# Patient Record
Sex: FEMALE | Race: WHITE | Employment: FULL TIME | URBAN - METROPOLITAN AREA
[De-identification: names, ages, dates, MRNs, and addresses within clinical notes are randomized per-mention and may not be internally consistent; named-entity substitution may affect disease eponyms.]

---

## 2018-03-26 ENCOUNTER — OFFICE VISIT (OUTPATIENT)
Dept: NEUROLOGY | Age: 43
End: 2018-03-26

## 2018-03-26 ENCOUNTER — TELEPHONE (OUTPATIENT)
Dept: NEUROLOGY | Age: 43
End: 2018-03-26

## 2018-03-26 VITALS
DIASTOLIC BLOOD PRESSURE: 62 MMHG | BODY MASS INDEX: 23.78 KG/M2 | WEIGHT: 148 LBS | HEIGHT: 66 IN | SYSTOLIC BLOOD PRESSURE: 118 MMHG | OXYGEN SATURATION: 97 % | HEART RATE: 74 BPM | RESPIRATION RATE: 18 BRPM

## 2018-03-26 DIAGNOSIS — R26.89 BALANCE PROBLEM: ICD-10-CM

## 2018-03-26 DIAGNOSIS — F07.81 POST CONCUSSIVE SYNDROME: Primary | ICD-10-CM

## 2018-03-26 RX ORDER — DULOXETIN HYDROCHLORIDE 30 MG/1
30 CAPSULE, DELAYED RELEASE ORAL DAILY
COMMUNITY
End: 2021-08-04

## 2018-03-26 RX ORDER — LANOLIN ALCOHOL/MO/W.PET/CERES
10 CREAM (GRAM) TOPICAL
COMMUNITY

## 2018-03-26 RX ORDER — SUMATRIPTAN 100 MG/1
100 TABLET, FILM COATED ORAL
Qty: 9 TAB | Refills: 5 | Status: SHIPPED | OUTPATIENT
Start: 2018-03-26 | End: 2021-06-30

## 2018-03-26 RX ORDER — LEVOTHYROXINE SODIUM 75 UG/1
TABLET ORAL
COMMUNITY
End: 2021-02-14

## 2018-03-26 RX ORDER — BISMUTH SUBSALICYLATE 262 MG
1 TABLET,CHEWABLE ORAL DAILY
COMMUNITY
End: 2021-06-30

## 2018-03-26 RX ORDER — NAPROXEN SODIUM 220 MG
220 TABLET ORAL AS NEEDED
COMMUNITY
End: 2021-06-30

## 2018-03-26 NOTE — PROGRESS NOTES
NEUROLOGY NEW PATIENT CONSULTATION    REFERRED BY:  Chely Rodriguez MD    CHIEF COMPLAINT:  Headaches/imbalance    HISTORY OF PRESENT ILLNESS    HISTORY PROVIDED BY:  Patient  Family Member: daughter    Nany Lawrence is a 38 y.o. female who I am asked to see in consultation for headaches and balance issues. She has a history of concussion. She feels like physically she is clumsy. She has a weak ankle on the left. She broke her right big toe in December when a balance beam landed on it. She also has neuropathy of the feet due to prior lumbar disc disease. She has had two surgeries L5-S1. She was pregnant with her 3rd child and bent over and herniated the disc. She then pulled it out again 2 months post partum. She now has neuropathy on the right leg. She had an EMG done a year post op that showed damage from the knee down that is permanent. She had a concussion in Oct 2016. They were at an event when she was crawling out of her [employer] [place] and fell out and hit the back of her head on the arm of a folding chair. She had severe pain where she hit. She had severe headache. She didn't realize she might have had a concussion. Three months later in 2017 they were on a sking trip and had been on a 5 hour car ride. She got out of the car and the door swung back and hit her. She was dizzy, nauseaous, had severe headache. She didn't lose consciousness. She went to the ED and had a CT head. This was negative. She was diagnosed with concussion. She did 2 weeks of balance PT and was out of work. At 2 weeks it started improving. She still had some word finding issues. Overall she felt better. She did have an MRI C spine due to pain and was told she had arthritis. She has hit her head on cabinets several times since her concussion. At the beginning of March she hit her head on the dining room table and developed dizziness and headache. She had symptoms for several days then improved.  Following this she started getting severe headaches. She had left over imitrex that she took x 1. Subsequently she was diagnosed with an ear infection by her PCP on Friday. She is still on doxy for this. She was on augmentin for her dental work already. She does have a dental implant, number 19. She had this placed in October of 2017. This was two weeks prior to the first concussion. She just had to have the screw changed in March. She did have headaches with activity initially when it was placed. Tooth was on the left, headaches were on the right. She does have autoimmune thyroid. She doesn't have history of reactions to metals herself. FH of mom with Lupus, aunt with MS, aunt with RA, brother and cousins with [place]. Headaches improved with the doxycycline and she started with zyrtec at night. She also added flonase. Headache now low grade on right side. The other headaches were more intense and frontal. She does have bruxism. She does see an acupuncturist. She is on [address]. She feels like this has helped her with pain, etc. She will go every 6-8 weeks for an hour. She did have a past MRI brain in 2012 that was normal.     She does have a history of lumbar stenosis with laminectomy in 2011. She does have peripheral neuropathy. She is on cymbalta 30mg daily. She had gained weight on it. This decreasing dose did not have an effect on her headaches. She has gained about 15lbs over the course of the year but she is losing it. 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 She saw Dr. Lawson Tovar when she worked there.       PMH  Past Medical History:   Diagnosis Date    Depression    Hx of laminectomy      Social History     Social History    Marital status:      Spouse name: N/A    Number of children: N/A    Years of education: N/A     Social History Main Topics    Smoking status: None    Smokeless tobacco: None    Alcohol use None    Drug use: None    Sexual activity: Not Asked Other Topics Concern    None     Social History Narrative    None       FH  +FH of autoimmune as detailed in HPI    ALLERGIES  NKDA    CURRENT MEDS  Levothyroxine  Duloxetine  Melatonin  multivitamin    REVIEW OF SYSTEMS:     Y  N       Y  N  Y  N   Y  N  [] [x] AIDS          [x] [] Falls  [] [x] Memory Loss  [] [x]  Shortness of breath  [] [x] Anxiety          [x] [] Fatigue [] [x] Muscle Pain        [] [x]  Skipped beats  [] [x] Chest Pain   [] [x] Frequent HA [] [x] Ms Weakness     [] [x]  Snoring  [] [x] Constipation [] [x]Hearing loss [] [x] Nause/Vomiting  [] [x]  Stomach Pain  [] [x] Cough          [x] []Hepatitis [] [x] Neuropathy         [] [x]  Swallowing difficulty  [] [x] Depression  [] [x]Incontinence [] [x] Poor appetite      [] [x]  Vertigo  [] [x] Diarrhea       [] [x] Joint Pain [] [x] Rash                   [] [x]  Visual disturbances  [] [x] Fainting        [] [x] Leg Swelling [] [x] Ringing ears       [x] []  Weight changes      []Unable to obtain  ROS due to  []mental status change  []sedated   []intubated          PREVIOUS WORKUP  IMAGING: MRI brain in 2011 neg    Reviewed records from [facility] and saw MRI L spine with disc at L5 with herniation on right nerve root and MRI brain that was negative. LABS  No results found for this or any previous visit. PHYSICAL EXAM  Visit Vitals    /62    Pulse 74    Resp 18    Ht 5' 6\" (1.676 m)    Wt 67.1 kg (148 lb)    SpO2 97%    BMI 23.89 kg/m2     General:  Alert, cooperative, no distress. Head:  Normocephalic, without obvious abnormality, atraumatic. Eyes:  Conjunctivae/corneas clear. Pupils equal, round, reactive to light. Extraocular movements intact, VFF, NO papilledema   Lungs:  Heart:   Non labored breathing  Regular rate and rhythm, no carotid bruits   Abdomen:   Soft, non-distended   Extremities: Extremities normal, atraumatic, no cyanosis or edema. Pulses: 2+ and symmetric all extremities.    Skin: Skin color, texture, turgor normal. No rashes or lesions. Neurologic:  Gen: Attention normal             Language: naming, repetition, fluency normal             Memory: intact recent and remote memory  Cranial Nerves:  I: smell Not tested   II: visual fields Full to confrontation   II: pupils Equal, round, reactive to light   II: optic disc No papilledema   III,VII: ptosis none   III,IV,VI: extraocular muscles  Full ROM   V: mastication normal   V: facial light touch sensation  normal   VII: facial muscle function   symmetric   VIII: hearing symmetric   IX: soft palate elevation  normal   XI: trapezius strength  5/5   XI: sternocleidomastoid strength 5/5   XI: neck flexion strength  5/5   XII: tongue  midline     Motor: normal bulk and tone, no tremor              Strength: 5/5 all four extremities  Sensory: dec PP bilateral feet, R >L  Coordination: FTN intact, Rhomberg negative  Gait: normal gait including tandem   Reflexes: 3+ throughout, toes mute       IMPRESSION  Braeden Gallo is a 38 y.o. female who presents for evaluation of headaches. Patient suffered several concussions and I think this is contributing to her symptoms. She has typical postconcussive syndrome including dizziness, headaches, and balance issues. She also has some occasional word finding difficulties. She has had multiple concussions which could be contributing to her prolonged difficulties. At this point I do not think she needs a daily preventative. I would like to get updated imaging with an MRI of the brain to evaluate for any structural etiology of her repeated headaches and balance issues. Also, given her history of lumbar stenosis with a large disc at L5 that has not been removed, and her continued balance issues, I think reimaging of this would also be beneficial.  I suspect patient's neuropathy secondary to her lumbar stenosis could be contributing to her balance issues. RECOMMENDATIONS  1. MRI of the brain with and without contrast  2. MRI of the lumbar spine  3. Reviewed records from [facility].  Will hold off on any preventative medications for headache at this time. Discussed amitriptyline could be an option for postconcussive syndrome, but patient would like to give it time  5. May need to repeat balance physical therapy, but patient does have exercises she can do at home, so we will pursue this initially  6. Discussed postconcussive syndrome and education provided to patient. 7.  Imitrex for abortive. New prescription written today. FU 3 months    Alexandra Goltz, MD    CC: Moy Germain MD  Fax: [phone]    This note was created using voice recognition software. Despite editing, there may be syntax errors. This note will not be viewable in [address]

## 2018-03-26 NOTE — LETTER
Dear Giovanni Milian MD, Thank you for allowing me to see your patient, Tahir Mace for a neurological consultation. Please see my impression and recommendations as outlined in my note. Sincerely, Blas Crawford MD 
[facility] at [address] [place] [address] BY: 
Giovanni Milian MD 
 
CHIEF COMPLAINT: 
Headaches/imbalance HISTORY OF PRESENT ILLNESS HISTORY PROVIDED BY: 
Patient Family Member: daughter Tahir Mace is a 38 y.o. female who I am asked to see in consultation for headaches and balance issues. She has a history of concussion. She feels like physically she is clumsy. She has a weak ankle on the left. She broke her right big toe in December when a balance beam landed on it. She also has neuropathy of the feet due to prior lumbar disc disease. She has had two surgeries L5-S1. She was pregnant with her 3rd child and bent over and herniated the disc. She then pulled it out again 2 months post partum. She now has neuropathy on the right leg. She had an EMG done a year post op that showed damage from the knee down that is permanent. She had a concussion in Oct 2016. They were at an event when she was crawling out of her [employer] and fell out and hit the back of her head on the arm of a folding chair. She had severe pain where she hit. She had severe headache. She didn't realize she might have had a concussion. Three months later in 2017 they were on a sking trip and had been on a 5 hour car ride. She got out of the car and the door swung back and hit her. She was dizzy, nauseaous, had severe headache. She didn't lose consciousness. She went to the ED and had a CT head. This was negative. She was diagnosed with concussion. She did 2 weeks of balance PT and was out of work. At 2 weeks it started improving. She still had some word finding issues. Overall she felt better. She did have an MRI C spine due to pain and was told she had arthritis. She has hit her head on cabinets several times since her concussion. At the beginning of March she hit her head on the dining room table and developed dizziness and headache. She had symptoms for several days then improved. Following this she started getting severe headaches. She had left over imitrex that she took x 1. Subsequently she was diagnosed with an ear infection by her PCP on Friday. She is still on doxy for this. She was on augmentin for her dental work already. She does have a dental implant, number 19. She had this placed in October of 2017. This was two weeks prior to the first concussion. She just had to have the screw changed in March. She did have headaches with activity initially when it was placed. Tooth was on the left, headaches were on the right. She does have autoimmune thyroid. She doesn't have history of reactions to metals herself. FH of mom with Lupus, aunt with MS, aunt with RA, brother and cousins with [place]. Headaches improved with the doxycycline and she started with zyrtec at night. She also added flonase. Headache now low grade on right side. The other headaches were more intense and frontal. She does have bruxism. She does see an acupuncturist. She is on [address]. She feels like this has helped her with pain, etc. She will go every 6-8 weeks for an hour. She did have a past MRI brain in 2012 that was normal.  
 
She does have a history of lumbar stenosis with laminectomy in 2011. She does have peripheral neuropathy. She is on cymbalta 30mg daily. She had gained weight on it. This decreasing dose did not have an effect on her headaches. She has gained about 15lbs over the course of the year but she is losing it. 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 She saw Dr. Migdalia Winston when she worked there. [facility] Past Medical History:  
Diagnosis Date  Depression Hx of laminectomy [address] Social History Social History  Marital status:  Spouse name: N/A  
 Number of children: N/A  
 Years of education: N/A Social History Main Topics  Smoking status: None  Smokeless tobacco: None  Alcohol use None  Drug use: None  Sexual activity: Not Asked Other Topics Concern  None Social History Narrative  None FH 
+FH of autoimmune as detailed in HPI ALLERGIES 
NKDA CURRENT MEDS Levothyroxine Duloxetine Melatonin 
multivitamin REVIEW OF SYSTEMS:  
 
Y  N       Y  N  Y  N   Y  N 
[] [x] AIDS          [x] [] Falls  [] [x] Memory Loss  [] [x]  Shortness of breath 
[] [x] Anxiety          [x] [] Fatigue [] [x] Muscle Pain        [] [x]  Skipped beats 
[] [x] Chest Pain   [] [x] Frequent HA [] [x] Ms Weakness     [] [x]  Snoring 
[] [x] Constipation [] [x]Hearing loss [] [x] Nause/Vomiting  [] [x]  Stomach Pain 
[] [x] Cough          [x] []Hepatitis [] [x] Neuropathy         [] [x]  Swallowing difficulty 
[] [x] Depression  [] [x]Incontinence [] [x] Poor appetite      [] [x]  Vertigo 
[] [x] Diarrhea       [] [x] Joint Pain [] [x] Rash                   [] [x]  Visual disturbances 
[] [x] Fainting        [] [x] Leg Swelling [] [x] Ringing ears       [x] []  Weight changes []Unable to obtain  ROS due to  []mental status change  []sedated   []intubated PREVIOUS WORKUP IMAGING: MRI brain in 2011 neg Reviewed records from [address] [place] [address] and saw MRI L spine with disc at L5 with herniation on right nerve root and MRI brain that was negative. LABS No results found for this or any previous visit. PHYSICAL EXAM 
Visit Vitals  /62  Pulse 74  Resp 18  Ht 5' 6\" (1.676 m)  Wt 67.1 kg (148 lb)  SpO2 97%  BMI 23.89 kg/m2 General:  Alert, cooperative, no distress. Head:  Normocephalic, without obvious abnormality, atraumatic. Eyes:  Conjunctivae/corneas clear. Pupils equal, round, reactive to light. Extraocular movements intact, VFF, NO papilledema Lungs: 
Heart:   Non labored breathing Regular rate and rhythm, no carotid bruits Abdomen:   Soft, non-distended Extremities: Extremities normal, atraumatic, no cyanosis or edema. Pulses: 2+ and symmetric all extremities. Skin: Skin color, texture, turgor normal. No rashes or lesions. Neurologic:  Gen: Attention normal 
           Language: naming, repetition, fluency normal 
           Memory: intact recent and remote memory Cranial Nerves: 
I: smell Not tested II: visual fields Full to confrontation II: pupils Equal, round, reactive to light II: optic disc No papilledema III,VII: ptosis none III,IV,VI: extraocular muscles  Full ROM V: mastication normal  
V: facial light touch sensation  normal  
VII: facial muscle function   symmetric VIII: hearing symmetric IX: soft palate elevation  normal  
XI: trapezius strength  5/5 XI: sternocleidomastoid strength 5/5 XI: neck flexion strength  5/5 XII: tongue  midline Motor: normal bulk and tone, no tremor Strength: 5/5 all four extremities Sensory: dec PP bilateral feet, R >L Coordination: FTN intact, Rhomberg negative Gait: normal gait including tandem Reflexes: 3+ throughout, toes mute IMPRESSION Ria Downs is a 38 y.o. female who presents for evaluation of headaches. Patient suffered several concussions and I think this is contributing to her symptoms. She has typical postconcussive syndrome including dizziness, headaches, and balance issues. She also has some occasional word finding difficulties. She has had multiple concussions which could be contributing to her prolonged difficulties. At this point I do not think she needs a daily preventative.   I would like to get updated imaging with an MRI of the brain to evaluate for any structural etiology of her repeated headaches and balance issues. Also, given her history of lumbar stenosis with a large disc at L5 that has not been removed, and her continued balance issues, I think reimaging of this would also be beneficial.  I suspect patient's neuropathy secondary to her lumbar stenosis could be contributing to her balance issues. RECOMMENDATIONS 1. MRI of the brain with and without contrast 
2. MRI of the lumbar spine 3. Reviewed records from [facility] 4.  Will hold off on any preventative medications for headache at this time. Discussed amitriptyline could be an option for postconcussive syndrome, but patient would like to give it time 5. May need to repeat balance physical therapy, but patient does have exercises she can do at home, so we will pursue this initially 6. Discussed postconcussive syndrome and education provided to patient. 7.  Imitrex for abortive. New prescription written today. FU 3 months Mahogany Lin MD 
 
CC: Miles Chapman MD 
Fax: [phone] This note was created using voice recognition software. Despite editing, there may be syntax errors. This note will not be viewable in [address] Reviewed record in preparation for visit and have necessary documentation Pt did not bring medication to office visit for review Medication list reviewed and reconciled with patient Information was given to pt on Advanced Directives, Living Will 
opportunity was given for questions

## 2018-03-26 NOTE — PATIENT INSTRUCTIONS
[facility]   Statement to Patients  April 1, 2014      In an effort to ensure the large volume of patient prescription refills is processed in the most efficient and expeditious manner, we are asking our patients to assist us by calling your Pharmacy for all prescription refills, this will include also your  Mail Order Pharmacy. The pharmacy will contact our office electronically to continue the refill process. Please do not wait until the last minute to call your pharmacy. We need at least 48 hours (2days) to fill prescriptions. We also encourage you to call your pharmacy before going to  your prescription to make sure it is ready. With regard to controlled substance prescription refill requests (narcotic refills) that need to be picked up at our office, we ask your cooperation by providing us with at least 72 hours (3days) notice that you will need a refill. We will not refill narcotic prescription refill requests after 4:00pm on any weekday, Monday through Thursday, or after 2:00pm on Fridays, or on the weekends. We encourage everyone to explore another way of getting your prescription refill request processed using "[employer] our patient web portal through our electronic medical record system. "[employer] is an efficient and effective way to communicate your medication request directly to the office and  downloadable as an rony on your smart phone . "[employer] also features a review functionality that allows you to view your medication list as well as leave messages for your physician. Are you ready to get connected? If so please review the attatched instructions or speak to any of our staff to get you set up right away! Thank you so much for your cooperation. Should you have any questions please contact our Practice Administrator.     The Physicians and Staff,  [facility] [id number] [address]  What is a living will?    A living will is a legal form you use to write down the kind of care you want at the end of your life. It is used by the health professionals who will treat you if you aren't able to decide for yourself. If you put your wishes in writing, your loved ones and others will know what kind of care you want. They won't need to guess. This can ease your mind and be helpful to others. A living will is not the same as an estate or property will. An estate will explains what you want to happen with your money and property after you die. Is a living will a legal document? A living will is a legal document. Each state has its own laws about living vernon. If you move to another state, make sure that your living will is legal in the state where you now live. Or you might use a universal form that has been approved by many states. This kind of form can sometimes be completed and stored online. Your electronic copy will then be available wherever you have a connection to the Internet. In most cases, doctors will respect your wishes even if you have a form from a different state. · You don't need an  to complete a living will. But legal advice can be helpful if your state's laws are unclear, your health history is complicated, or your family can't agree on what should be in your living will. · You can change your living will at any time. Some people find that their wishes about end-of-life care change as their health changes. · In addition to making a living will, think about completing a medical power of  form. This form lets you name the person you want to make end-of-life treatment decisions for you (your \"health care agent\") if you're not able to. Many hospitals and nursing homes will give you the forms you need to complete a living will and a medical power of . · Your living will is used only if you can't make or communicate decisions for yourself anymore.  If you become able to make decisions again, you can accept or refuse any treatment, no matter what you wrote in your living will. · Your state may offer an online registry. This is a place where you can store your living will online so the doctors and nurses who need to treat you can find it right away. What should you think about when creating a living will? Talk about your end-of-life wishes with your family members and your doctor. Let them know what you want. That way the people making decisions for you won't be surprised by your choices. Think about these questions as you make your living will:  · Do you know enough about life support methods that might be used? If not, talk to your doctor so you know what might be done if you can't breathe on your own, your heart stops, or you're unable to swallow. · What things would you still want to be able to do after you receive life-support methods? Would you want to be able to walk? To speak? To eat on your own? To live without the help of machines? · If you have a choice, where do you want to be cared for? In your home? At a hospital or nursing home? · Do you want certain Baptism practices performed if you become very ill? · If you have a choice at the end of your life, where would you prefer to die? At home? In a hospital or nursing home? Somewhere else? · Would you prefer to be buried or cremated? · Do you want your organs to be donated after you die? What should you do with your living will? · Make sure that your family members and your health care agent have copies of your living will. · Give your doctor a copy of your living will to keep in your medical record. If you have more than one doctor, make sure that each one has a copy. · You may want to put a copy of your living will where it can be easily found. Where can you learn more? Go to http://iraida-chu.info/. Enter R297 in the search box to learn more about \"Learning About Living Vadim. \"  Current as of: September 24, 2016  Content Version: 11.4  © 4305-1118 [employer]. Care instructions adapted under license by [employer] (which disclaims liability or warranty for this information). If you have questions about a medical condition or this instruction, always ask your healthcare professional. [address] any warranty or liability for your use of this information. Advance Directives: Care Instructions  Your Care Instructions  An advance directive is a legal way to state your wishes at the end of your life. It tells your family and your doctor what to do if you can no longer say what you want. There are two main types of advance directives. You can change them any time that your wishes change. · A living will tells your family and your doctor your wishes about life support and other treatment. · A durable power of  for health care lets you name a person to make treatment decisions for you when you can't speak for yourself. This person is called a health care agent. If you do not have an advance directive, decisions about your medical care may be made by a doctor or a  who doesn't know you. It may help to think of an advance directive as a gift to the people who care for you. If you have one, they won't have to make tough decisions by themselves. Follow-up care is a key part of your treatment and safety. Be sure to make and go to all appointments, and call your doctor if you are having problems. It's also a good idea to know your test results and keep a list of the medicines you take. How can you care for yourself at home? · Discuss your wishes with your loved ones and your doctor. This way, there are no surprises. · Many states have a unique form. Or you might use a universal form that has been approved by many states. This kind of form can sometimes be completed and stored online.  Your electronic copy will then be available wherever you have a connection to the Internet. In most cases, doctors will respect your wishes even if you have a form from a different state. · You don't need a  to do an advance directive. But you may want to get legal advice. · Think about these questions when you prepare an advance directive:  ¨ Who do you want to make decisions about your medical care if you are not able to? Many people choose a family member or close friend. ¨ Do you know enough about life support methods that might be used? If not, talk to your doctor so you understand. ¨ What are you most afraid of that might happen? You might be afraid of having pain, losing your independence, or being kept alive by machines. ¨ Where would you prefer to die? Choices include your home, a hospital, or a nursing home. ¨ Would you like to have information about hospice care to support you and your family? ¨ Do you want to donate organs when you die? ¨ Do you want certain Anglican practices performed before you die? If so, put your wishes in the advance directive. · Read your advance directive every year, and make changes as needed. When should you call for help? Be sure to contact your doctor if you have any questions. Where can you learn more? Go to http://iraida-chu.info/. Enter R264 in the search box to learn more about \"Advance Directives: Care Instructions. \"  Current as of: September 24, 2016  Content Version: 11.4  © 3087-0908 [employer]. Care instructions adapted under license by [employer] (which disclaims liability or warranty for this information). If you have questions about a medical condition or this instruction, always ask your healthcare professional. [address] any warranty or liability for your use of this information. Patient Instructions/Plans:  Postconcussion syndrome (The Basics)    What is postconcussion syndrome?   Postconcussion syndrome is a condition that happens after a mild brain injury. A concussion is another word for a mild brain injury. Common causes of mild brain injuries include:   Car accidents   Falling down and other accidents that can happen from daily activities   Injuries from playing sports such as football, ice hockey, soccer, and boxing   Injuries that can happen to soldiers during combat. These include injuries from blasts and bullet wounds. What are the symptoms of postconcussion syndrome?  The symptoms include:  Headache   Dizziness   Feeling very tired   Feeling irritable or anxious   Memory problems or problems paying attention   Problems with sleep  Being easily bothered by noise     Will I need tests?  Maybe. Your doctor or nurse should be able to tell if you have postconcussion syndrome by learning about your symptoms and doing an exam.   But depending on your symptoms, you might have tests to make sure you do not have a different problem. For example, some people have an imaging test called an MRI that creates pictures of the brain. How is postconcussion syndrome treated?  The treatments are different depending on which symptoms a person has. There are medicines that can help with headaches, dizziness, and other problems. If you have postconcussion syndrome, your symptoms will probably start to go away after about a week. Most people start to feel better in a week or 2 and are back to normal in 3 months. But a few people have symptoms that last longer.

## 2018-03-26 NOTE — MR AVS SNAPSHOT
[address] [address] [facility] [address] 
[phone] Patient: Aura Louis MRN: [id number] Visit Information Date & Time Provider Department Dept. Phone Encounter #  
 3/26/2018  9:00 AM Huma Saunders MD [address] [facility] [phone] [id number] Your Appointments 5/14/2018 11:20 AM  
Follow Up with Huma Saunders MD  
[address] [place] [address] Neurology Clinic [facility] Appt Note: headache  
 [address] [address] [address] [address]  
[facility] [address] [facility] [address] Upcoming Health Maintenance Date Due DTaP/Tdap/Td series (1 - Tdap) 7/19/1998 PAP AKA CERVICAL CYTOLOGY 7/19/1998 Influenza Age 5 to Adult 8/1/2017 Allergies as of 3/26/2018  Review Complete On: 3/26/2018 By: Huma Saunders MD  
 Not on File Current Immunizations  Never Reviewed No immunizations on file. Not reviewed this visit You Were Diagnosed With   
  
 Codes Comments Post concussive syndrome    -  Primary ICD-10-CM: F07.81 ICD-9-CM: 310.2 Balance problem     ICD-10-CM: R26.89 
ICD-9-CM: 781.99 Vitals BP Pulse Resp Height(growth percentile) Weight(growth percentile) SpO2  
 118/62 74 18 5' 6\" (1.676 m) 148 lb (67.1 kg) 97% BMI Smoking Status 23.89 kg/m2 Never Smoker BMI and BSA Data Body Mass Index Body Surface Area  
 23.89 kg/m 2 1.77 m 2 Preferred Pharmacy Pharmacy Name Phone [facility], [address] [phone] Your Updated Medication List  
  
   
This list is accurate as of 3/26/18 10:08 AM.  Always use your most recent med list.  
  
  
  
  
 CYMBALTA 30 mg capsule Generic drug:  DULoxetine Take 30 mg by mouth daily. levothyroxine 75 mcg tablet Commonly known as:  SYNTHROID  
 Take  by mouth Daily (before breakfast). melatonin 3 mg tablet Take  by mouth.  
  
 multivitamin tablet Commonly known as:  ONE A DAY Take 1 Tab by mouth daily. PROBIOTIC 4X 10-15 mg Tbec Generic drug:  B.infantis-B.ani-B.long-B.bifi Take  by mouth. SUMAtriptan 100 mg tablet Commonly known as:  IMITREX Take 1 Tab by mouth once as needed for Migraine for up to 1 dose. Prescriptions Sent to Pharmacy Refills SUMAtriptan (IMITREX) 100 mg tablet 5 Sig: Take 1 Tab by mouth once as needed for Migraine for up to 1 dose. Class: Normal  
 Pharm[facility]: [facility], [address] [address] Ph #: [phone] Route: Oral  
  
To-Do List   
 03/27/2018 Imaging:  MRI BRAIN W WO CONT   
  
 03/27/2018 Imaging:  MRI LUMB SPINE W WO CONT Patient Instructions PRESCRIPTION REFILL POLICY [facility] Statement to Patients April 1, 2014 In an effort to ensure the large volume of patient prescription refills is processed in the most efficient and expeditious manner, we are asking our patients to assist us by calling your Pharmacy for all prescription refills, this will include also your  Mail Order Pharmacy. The pharmacy will contact our office electronically to continue the refill process. Please do not wait until the last minute to call your pharmacy. We need at least 48 hours (2days) to fill prescriptions. We also encourage you to call your pharmacy before going to  your prescription to make sure it is ready. With regard to controlled substance prescription refill requests (narcotic refills) that need to be picked up at our office, we ask your cooperation by providing us with at least 72 hours (3days) notice that you will need a refill.  
 
We will not refill narcotic prescription refill requests after 4:00pm on any weekday, Monday through Thursday, or after 2:00pm on Fridays, or on the weekends. We encourage everyone to explore another way of getting your prescription refill request processed using [employer], our patient web portal through our electronic medical record system. [employer] is an efficient and effective way to communicate your medication request directly to the office and  downloadable as an rony on your smart phone . [employer] also features a review functionality that allows you to view your medication list as well as leave messages for your physician. Are you ready to get connected? If so please review the attatched instructions or speak to any of our staff to get you set up right away! Thank you so much for your cooperation. Should you have any questions please contact our Practice Administrator. The Physicians and Staff,  [facility] Keyon Srinivasan [address] What is a living will? A living will is a legal form you use to write down the kind of care you want at the end of your life. It is used by the health professionals who will treat you if you aren't able to decide for yourself. If you put your wishes in writing, your loved ones and others will know what kind of care you want. They won't need to guess. This can ease your mind and be helpful to others. A living will is not the same as an estate or property will. An estate will explains what you want to happen with your money and property after you die. Is a living will a legal document? A living will is a legal document. Each state has its own laws about living vernon. If you move to another state, make sure that your living will is legal in the state where you now live. Or you might use a universal form that has been approved by many states. This kind of form can sometimes be completed and stored online. Your electronic copy will then be available wherever you have a connection to the Internet.  In most cases, doctors will respect your wishes even if you have a form from a different state. · You don't need an  to complete a living will. But legal advice can be helpful if your state's laws are unclear, your health history is complicated, or your family can't agree on what should be in your living will. · You can change your living will at any time. Some people find that their wishes about end-of-life care change as their health changes. · In addition to making a living will, think about completing a medical power of  form. This form lets you name the person you want to make end-of-life treatment decisions for you (your \"health care agent\") if you're not able to. Many hospitals and nursing homes will give you the forms you need to complete a living will and a medical power of . · Your living will is used only if you can't make or communicate decisions for yourself anymore. If you become able to make decisions again, you can accept or refuse any treatment, no matter what you wrote in your living will. · Your state may offer an online registry. This is a place where you can store your living will online so the doctors and nurses who need to treat you can find it right away. What should you think about when creating a living will? Talk about your end-of-life wishes with your family members and your doctor. Let them know what you want. That way the people making decisions for you won't be surprised by your choices. Think about these questions as you make your living will: · Do you know enough about life support methods that might be used? If not, talk to your doctor so you know what might be done if you can't breathe on your own, your heart stops, or you're unable to swallow. · What things would you still want to be able to do after you receive life-support methods? Would you want to be able to walk? To speak? To eat on your own? To live without the help of machines? · If you have a choice, where do you want to be cared for? In your home? At a hospital or nursing home? · Do you want certain Hindu practices performed if you become very ill? · If you have a choice at the end of your life, where would you prefer to die? At home? In a hospital or nursing home? Somewhere else? · Would you prefer to be buried or cremated? · Do you want your organs to be donated after you die? What should you do with your living will? · Make sure that your family members and your health care agent have copies of your living will. · Give your doctor a copy of your living will to keep in your medical record. If you have more than one doctor, make sure that each one has a copy. · You may want to put a copy of your living will where it can be easily found. Where can you learn more? Go to http://iraida-chu.info/. Enter H270 in the search box to learn more about \"Learning About Living Perroyamilet. \" Current as of: September 24, 2016 Content Version: 11.4 © 4454-5985 [employer]. Care instructions adapted under license by ihush.com (which disclaims liability or warranty for this information). If you have questions about a medical condition or this instruction, always ask your healthcare professional. [place] [address] any warranty or liability for your use of this information. Advance Directives: Care Instructions Your Care Instructions An advance directive is a legal way to state your wishes at the end of your life. It tells your family and your doctor what to do if you can no longer say what you want. There are two main types of advance directives. You can change them any time that your wishes change. · A living will tells your family and your doctor your wishes about life support and other treatment. · A durable power of  for health care lets you name a person to make treatment decisions for you when you can't speak for yourself. This person is called a health care agent. If you do not have an advance directive, decisions about your medical care may be made by a doctor or a  who doesn't know you. It may help to think of an advance directive as a gift to the people who care for you. If you have one, they won't have to make tough decisions by themselves. Follow-up care is a key part of your treatment and safety. Be sure to make and go to all appointments, and call your doctor if you are having problems. It's also a good idea to know your test results and keep a list of the medicines you take. How can you care for yourself at home? · Discuss your wishes with your loved ones and your doctor. This way, there are no surprises. · Many states have a unique form. Or you might use a universal form that has been approved by many states. This kind of form can sometimes be completed and stored online. Your electronic copy will then be available wherever you have a connection to the Internet. In most cases, doctors will respect your wishes even if you have a form from a different state. · You don't need a  to do an advance directive. But you may want to get legal advice. · Think about these questions when you prepare an advance directive: ¨ Who do you want to make decisions about your medical care if you are not able to? Many people choose a family member or close friend. ¨ Do you know enough about life support methods that might be used? If not, talk to your doctor so you understand. ¨ What are you most afraid of that might happen? You might be afraid of having pain, losing your independence, or being kept alive by machines. ¨ Where would you prefer to die? Choices include your home, a hospital, or a nursing home. ¨ Would you like to have information about hospice care to support you and your family? ¨ Do you want to donate organs when you die? ¨ Do you want certain Hinduism practices performed before you die? If so, put your wishes in the advance directive. · Read your advance directive every year, and make changes as needed. When should you call for help? Be sure to contact your doctor if you have any questions. Where can you learn more? Go to http://iraida-chu.info/. Enter R264 in the search box to learn more about \"Advance Directives: Care Instructions. \" Current as of: September 24, 2016 Content Version: 11.4 © 4000-2502 [employer]. Care instructions adapted under license by [employer] (which disclaims liability or warranty for this information). If you have questions about a medical condition or this instruction, always ask your healthcare professional. [place] [address] any warranty or liability for your use of this information. Patient Instructions/Plans: 
Postconcussion syndrome (The Basics) What is postconcussion syndrome?  Postconcussion syndrome is a condition that happens after a mild brain injury. A concussion is another word for a mild brain injury. Common causes of mild brain injuries include:  
Car accidents Falling down and other accidents that can happen from daily activities Injuries from playing sports such as football, ice hockey, soccer, and boxing Injuries that can happen to soldiers during combat. These include injuries from blasts and bullet wounds. What are the symptoms of postconcussion syndrome?  The symptoms include: 
Headache Dizziness Feeling very tired Feeling irritable or anxious Memory problems or problems paying attention Problems with sleep Being easily bothered by noise Will I need tests?  Maybe. Your doctor or nurse should be able to tell if you have postconcussion syndrome by learning about your symptoms and doing an exam.  
But depending on your symptoms, you might have tests to make sure you do not have a different problem. For example, some people have an imaging test called an MRI that creates pictures of the brain. How is postconcussion syndrome treated?  The treatments are different depending on which symptoms a person has. There are medicines that can help with headaches, dizziness, and other problems. If you have postconcussion syndrome, your symptoms will probably start to go away after about a week. Most people start to feel better in a week or 2 and are back to normal in 3 months. But a few people have symptoms that last longer. Introducing [facility]! Omar Vega introduces [employer] patient portal. Now you can access parts of your medical record, email your doctor's office, and request medication refills online. 1. In your internet browser, go to [email or web address] 2. Click on the First Time User? Click Here link in the Sign In box. You will see the New Member Sign Up page. 3. Enter your [employer] Access Code exactly as it appears below. You will not need to use this code after youve completed the sign-up process. If you do not sign up before the expiration date, you must request a new code. · [employer] Access Code: [email or web address] Expires: 6/24/2018  8:58 AM 
 
4. Enter the last four digits of your Social Security Number (xxxx) and Date of Birth (mm/dd/yyyy) as indicated and click Submit. You will be taken to the next sign-up page. 5. Create a [employer] ID. This will be your [employer] login ID and cannot be changed, so think of one that is secure and easy to remember. 6. Create a [employer] password. You can change your password at any time. 7. Enter your Password Reset Question and Answer. This can be used at a later time if you forget your password. 8. Enter your e-mail address. You will receive e-mail notification when new information is available in [address] 9. Click Sign Up. You can now view and download portions of your medical record. 10. Click the Download Summary menu link to download a portable copy of your medical information. If you have questions, please visit the Frequently Asked Questions section of the nextsocialt website. Remember, [employer] is NOT to be used for urgent needs. For medical emergencies, dial 911. Now available from your iPhone and Android! Please provide this summary of care documentation to your next provider. Your primary care clinician is listed as Bernice Ceja. If you have any questions after today's visit, please call [phone].

## 2018-04-09 ENCOUNTER — TELEPHONE (OUTPATIENT)
Dept: NEUROLOGY | Age: 43
End: 2018-04-09

## 2018-04-09 NOTE — TELEPHONE ENCOUNTER
Lm on vm letting know will send order to [facility] and they will call for appt  Return this call if needed

## 2018-04-09 NOTE — TELEPHONE ENCOUNTER
----- Message from Margarita Clark sent at 4/9/2018  8:15 AM EDT -----  Regarding: /telephone  Pt is requesting a call back in regards to having her MRI done at the [facility]. Best contact [phone].     [facility] [phone]

## 2018-04-13 ENCOUNTER — TELEPHONE (OUTPATIENT)
Dept: NEUROLOGY | Age: 43
End: 2018-04-13

## 2018-04-13 NOTE — TELEPHONE ENCOUNTER
Wilmar Flores from Outpatient Scheduling called and needs notes for the pt to be scheduled for [facility]. Their fax number is [phone].

## 2018-04-26 ENCOUNTER — TELEPHONE (OUTPATIENT)
Dept: NEUROLOGY | Age: 43
End: 2018-04-26

## 2018-04-26 ENCOUNTER — DOCUMENTATION ONLY (OUTPATIENT)
Dept: NEUROLOGY | Age: 43
End: 2018-04-26

## 2018-04-26 NOTE — PROGRESS NOTES
MRI brain w/wo approved #[id number]    MRI L spine denied unless patient does 6 weeks of PT first and has re evaluation    Ave Falcon MD  4/26/2018  8:21 AM

## 2018-04-26 NOTE — TELEPHONE ENCOUNTER
LM on VM for patient to call office. This is concerning the need for PT to be done for 6wks so that we can possibly get the authorization for the MRI of the L-spine.

## 2018-04-27 NOTE — TELEPHONE ENCOUNTER
----- Message from [email or web address] sent at 4/27/2018  9:30 AM EDT -----  Regarding: Dr Ascencio/Telephone  Pt was returning a phone call.       Contact [phone]

## 2018-05-03 NOTE — TELEPHONE ENCOUNTER
Message left again for patient re her insurance requiring PT prior to authorizing MRI. Call office with decision.

## 2018-05-23 LAB — PAP SMEAR, EXTERNAL: NORMAL

## 2018-05-24 LAB — LDL-C, EXTERNAL: 73

## 2018-05-29 ENCOUNTER — TELEPHONE (OUTPATIENT)
Dept: NEUROLOGY | Age: 43
End: 2018-05-29

## 2018-05-29 NOTE — TELEPHONE ENCOUNTER
----- Message from Donald Garay sent at 5/29/2018 10:54 AM EDT -----  Regarding: Dr. Carmencita Gtz   Pt called to get a the results for her MRI and would like a call back. Pt best contact number ([phone].

## 2018-05-30 ENCOUNTER — TELEPHONE (OUTPATIENT)
Dept: NEUROLOGY | Age: 43
End: 2018-05-30

## 2018-05-30 NOTE — TELEPHONE ENCOUNTER
----- Message from Yovani Roe sent at 5/30/2018  3:21 PM EDT -----  Regarding: Dr. Magi Key  Pt returning a miss call from the nurse.  Best contact number [phone]

## 2018-05-30 NOTE — TELEPHONE ENCOUNTER
----- Message from Angel Sharpe sent at 5/30/2018 10:01 AM EDT -----  Regarding: Dr. Catherine Strauss   Pt would like to get her MRI results that was done [facility]. Best contact number is [phone].

## 2018-05-31 NOTE — TELEPHONE ENCOUNTER
Discussed MRI results that were negative per Dr. Evelio Smith. Patient does not want to do PT in order to get lumber MRI at this time.

## 2018-06-06 ENCOUNTER — DOCUMENTATION ONLY (OUTPATIENT)
Dept: NEUROLOGY | Age: 43
End: 2018-06-06

## 2018-06-07 NOTE — PROGRESS NOTES
MRI of the brain exam date 5/23/18.   Result: Normal MRI of the brain with and without contrast.  See media tab for full report    Ave Falcon MD  6/6/2018

## 2018-07-30 ENCOUNTER — OFFICE VISIT (OUTPATIENT)
Dept: NEUROLOGY | Age: 43
End: 2018-07-30

## 2018-07-30 VITALS
DIASTOLIC BLOOD PRESSURE: 68 MMHG | RESPIRATION RATE: 16 BRPM | WEIGHT: 146 LBS | BODY MASS INDEX: 23.46 KG/M2 | HEART RATE: 64 BPM | HEIGHT: 66 IN | SYSTOLIC BLOOD PRESSURE: 108 MMHG | OXYGEN SATURATION: 98 %

## 2018-07-30 DIAGNOSIS — F07.81 POST CONCUSSIVE SYNDROME: ICD-10-CM

## 2018-07-30 DIAGNOSIS — G43.009 MIGRAINE WITHOUT AURA AND WITHOUT STATUS MIGRAINOSUS, NOT INTRACTABLE: ICD-10-CM

## 2018-07-30 DIAGNOSIS — R42 VERTIGO: Primary | ICD-10-CM

## 2018-07-30 NOTE — PROGRESS NOTES
Neurology Progress Note Patient ID: Venancio Rodriguez [id number] 
70 y.o. 
1975 HISTORY PROVIDED BY: 
Patient Chief Complaint: Dizziness/headache/balance issues Subjective:  
 Ms. Micaela Torres is here for follow up today of headaches and balance issues. Her headaches were going well up until a week ago. She was on vacation and hit her head on a TV on the wall. She got dizziness and nausea immediately. She had to go right to bed. She slept all day. She took ibuprofen whenever she woke up. By the following morning they drove to the beach and she felt sick with nausea and headache but was fine while resting on the beach. She now has a headache on the right side. Her balance is off. She just got back from a vacation today. She took a red eye last night. Recap: 
Venancio Rodriguez is a 38 y.o. female who I am asked to see in consultation for headaches and balance issues. She has a history of concussion. She feels like physically she is clumsy. She has a weak ankle on the left. She broke her right big toe in December when a balance beam landed on it. She also has neuropathy of the feet due to prior lumbar disc disease. She has had two surgeries L5-S1. She was pregnant with her 3rd child and bent over and herniated the disc. She then pulled it out again 2 months post partum. She now has neuropathy on the right leg. She had an EMG done a year post op that showed damage from the knee down that is permanent. She had a concussion in Oct 2016. They were at an event when she was crawling out of her [employer] [place] and fell out and hit the back of her head on the arm of a folding chair. She had severe pain where she hit. She had severe headache. She didn't realize she might have had a concussion. Three months later in 2017 they were on a sking trip and had been on a 5 hour car ride. She got out of the car and the door swung back and hit her. She was dizzy, nauseaous, had severe headache.  She didn't lose consciousness. She went to the ED and had a CT head. This was negative. She was diagnosed with concussion. She did 2 weeks of balance PT and was out of work. At 2 weeks it started improving. She still had some word finding issues. Overall she felt better. She did have an MRI C spine due to pain and was told she had arthritis. She has hit her head on cabinets several times since her concussion. At the beginning of March she hit her head on the dining room table and developed dizziness and headache. She had symptoms for several days then improved. Following this she started getting severe headaches. She had left over imitrex that she took x 1. Subsequently she was diagnosed with an ear infection by her PCP on Friday. She is still on doxy for this. She was on augmentin for her dental work already. She does have a dental implant, number 19. She had this placed in October of 2017. This was two weeks prior to the first concussion. She just had to have the screw changed in March. She did have headaches with activity initially when it was placed. Tooth was on the left, headaches were on the right. She does have autoimmune thyroid. She doesn't have history of reactions to metals herself. FH of mom with Lupus, aunt with MS, aunt with RA, brother and cousins with Brien Garcia. Headaches improved with the doxycycline and she started with zyrtec at night. She also added flonase. Headache now low grade on right side. The other headaches were more intense and frontal. She does have bruxism. She does see an acupuncturist. She is on [address]. She feels like this has helped her with pain, etc. She will go every 6-8 weeks for an hour. She did have a past MRI brain in 2012 that was normal.  
 
She does have a history of lumbar stenosis with laminectomy in 2011. She does have peripheral neuropathy. She is on cymbalta 30mg daily. She had gained weight on it.  This decreasing dose did not have an effect on her headaches. She has gained about 15lbs over the course of the year but she is losing it. 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 She used to work at [employer]. She saw Dr. Samira Nogueira when she worked there. Objective:  
ROS: 
Per HPI- 
Otherwise 12 point ROS was negative Meds: 
Current Outpatient Prescriptions on File Prior to Visit Medication Sig Dispense Refill  levothyroxine (SYNTHROID) 75 mcg tablet Take  by mouth Daily (before breakfast).  multivitamin (ONE A DAY) tablet Take 1 Tab by mouth daily.  melatonin 3 mg tablet Take 1 mg by mouth nightly.  DULoxetine (CYMBALTA) 30 mg capsule Take 30 mg by mouth daily.  B.infantis-B.ani-B.long-B.bifi (PROBIOTIC 4X) 10-15 mg TbEC Take  by mouth.  SUMAtriptan (IMITREX) 100 mg tablet Take 1 Tab by mouth once as needed for Migraine for up to 1 dose. 9 Tab 5  
 naproxen sodium (ALEVE) 220 mg tablet Take 220 mg by mouth as needed. No current facility-administered medications on file prior to visit. Imaging: MRI brain: Normal 
 
Reviewed records in [employer] and [facility] tab today Lab Review No results found for this or any previous visit. Exam: 
Visit Vitals  /68  Pulse 64  Resp 16  
 Ht 5' 6\" (1.676 m)  Wt 66.2 kg (146 lb)  SpO2 98%  BMI 23.57 kg/m2 Gen: Well developed CV: RRR Lungs: non labored breathing Abd: non distending Neuro: A&O x 3, no dysarthria or aphasia CN II-XII: PERRL, EOMI, face symmetric, tongue/palate midline Motor: strength 5/5 all four ext Sensory: intact to LT Gait: Slightly wide-based Assessment:  
Serjio Nash is a 41 y.o. female who presents for follow up of headaches and dizziness. Patient suffered another concussion since I saw her last.  Subsequently she has a headache today. She would like to consider vestibular rehab at this point to hopefully help her avoid future imbalance/head trauma. She is Imitrex to take as needed for her migraines.   She also Zofran to take as needed. She does have a history of a disc at L5 so we will need to continue to monitor for this as well. Her insurance refused an MRI of the lumbar spine until she does physical therapy. Plan:  
 
  
Visit Diagnoses Vertigo    -  Primary Relevant Medications · Diclofenac Potassium (CAMBIA) 50 mg pwpk Other Relevant Orders · REFERRAL TO PHYSICAL THERAPY for vestibular rehab Migraine without aura and without status migrainosus, not intractable Relevant Medications · Diclofenac Potassium (CAMBIA) 50 mg pwpk · Imitrex to take as needed · Zofran to take as needed Other Relevant Orders · Encouraged migraine diary Lumbar stenosis · MRI lumbar spine denied by insurance · We will continue to monitor Post concussive syndrome Relevant Medications · Diclofenac Potassium (CAMBIA) 50 mg pwpk Other Relevant Orders · REFERRAL TO PHYSICAL THERAPY for vestibular rehab/postconcussive rehab Follow-up in 3 months Signed: 
Gladys Moon MD 
7/30/2018 Medications and side effects discussed with patient in detail. With any new medications prescribed, patient was given instructions on administration and side effects. Written medication information was provided to the patient as well. This note was created using voice recognition software. Despite editing, there may be syntax errors. This note will not be viewable in [address]

## 2018-07-30 NOTE — LETTER
Neurology Progress Note Patient ID: Bernice Lam [id number] 
76 y.o. 
1975 HISTORY PROVIDED BY: 
Patient Chief Complaint: Dizziness/headache/balance issues Subjective:  
 Ms. Lovena Halsted is here for follow up today of headaches and balance issues. Her headaches were going well up until a week ago. She was on vacation and hit her head on a TV on the wall. She got dizziness and nausea immediately. She had to go right to bed. She slept all day. She took ibuprofen whenever she woke up. By the following morning they drove to the beach and she felt sick with nausea and headache but was fine while resting on the beach. She now has a headache on the right side. Her balance is off. She just got back from a vacation today. She took a red eye last night. Recap: 
Bernice Lam is a 38 y.o. female who I am asked to see in consultation for headaches and balance issues. She has a history of concussion. She feels like physically she is clumsy. She has a weak ankle on the left. She broke her right big toe in December when a balance beam landed on it. She also has neuropathy of the feet due to prior lumbar disc disease. She has had two surgeries L5-S1. She was pregnant with her 3rd child and bent over and herniated the disc. She then pulled it out again 2 months post partum. She now has neuropathy on the right leg. She had an EMG done a year post op that showed damage from the knee down that is permanent. She had a concussion in Oct 2016. They were at an event when she was crawling out of her [employer] [place] and fell out and hit the back of her head on the arm of a folding chair. She had severe pain where she hit. She had severe headache. She didn't realize she might have had a concussion. Three months later in 2017 they were on a sking trip and had been on a 5 hour car ride. She got out of the car and the door swung back and hit her. She was dizzy, nauseaous, had severe headache.  She didn't lose consciousness. She went to the ED and had a CT head. This was negative. She was diagnosed with concussion. She did 2 weeks of balance PT and was out of work. At 2 weeks it started improving. She still had some word finding issues. Overall she felt better. She did have an MRI C spine due to pain and was told she had arthritis. She has hit her head on cabinets several times since her concussion. At the beginning of March she hit her head on the dining room table and developed dizziness and headache. She had symptoms for several days then improved. Following this she started getting severe headaches. She had left over imitrex that she took x 1. Subsequently she was diagnosed with an ear infection by her PCP on Friday. She is still on doxy for this. She was on augmentin for her dental work already. She does have a dental implant, number 19. She had this placed in October of 2017. This was two weeks prior to the first concussion. She just had to have the screw changed in March. She did have headaches with activity initially when it was placed. Tooth was on the left, headaches were on the right. She does have autoimmune thyroid. She doesn't have history of reactions to metals herself. FH of mom with Lupus, aunt with MS, aunt with RA, brother and cousins with Jyotsna Stanford. Headaches improved with the doxycycline and she started with zyrtec at night. She also added flonase. Headache now low grade on right side. The other headaches were more intense and frontal. She does have bruxism. She does see an acupuncturist. She is on [address]. She feels like this has helped her with pain, etc. She will go every 6-8 weeks for an hour. She did have a past MRI brain in 2012 that was normal.  
 
She does have a history of lumbar stenosis with laminectomy in 2011. She does have peripheral neuropathy. She is on cymbalta 30mg daily.  She had gained weight on it. This decreasing dose did not have an effect on her headaches. She has gained about 15lbs over the course of the year but she is losing it. 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 She saw Dr. Manuel Hanson when she worked there. Objective:  
ROS: 
Per HPI- 
Otherwise 12 point ROS was negative Meds: 
Current Outpatient Prescriptions on File Prior to Visit Medication Sig Dispense Refill  levothyroxine (SYNTHROID) 75 mcg tablet Take  by mouth Daily (before breakfast).  multivitamin (ONE A DAY) tablet Take 1 Tab by mouth daily.  melatonin 3 mg tablet Take 1 mg by mouth nightly.  DULoxetine (CYMBALTA) 30 mg capsule Take 30 mg by mouth daily.  B.infantis-B.ani-B.long-B.bifi (PROBIOTIC 4X) 10-15 mg TbEC Take  by mouth.  SUMAtriptan (IMITREX) 100 mg tablet Take 1 Tab by mouth once as needed for Migraine for up to 1 dose. 9 Tab 5  
 naproxen sodium (ALEVE) 220 mg tablet Take 220 mg by mouth as needed. No current facility-administered medications on file prior to visit. Imaging: MRI brain: Normal 
 
Reviewed records in [employer] and [employer] tab today Lab Review No results found for this or any previous visit. Exam: 
Visit Vitals  /68  Pulse 64  Resp 16  
 Ht 5' 6\" (1.676 m)  Wt 66.2 kg (146 lb)  SpO2 98%  BMI 23.57 kg/m2 Gen: Well developed CV: RRR Lungs: non labored breathing Abd: non distending Neuro: A&O x 3, no dysarthria or aphasia CN II-XII: PERRL, EOMI, face symmetric, tongue/palate midline Motor: strength 5/5 all four ext Sensory: intact to LT Gait: Slightly wide-based Assessment:  
Colby Guerrier is a 41 y.o. female who presents for follow up of headaches and dizziness. Patient suffered another concussion since I saw her last.  Subsequently she has a headache today.   She would like to consider vestibular rehab at this point to hopefully help her avoid future imbalance/head trauma. She is Imitrex to take as needed for her migraines. She also Zofran to take as needed. She does have a history of a disc at L5 so we will need to continue to monitor for this as well. Her insurance refused an MRI of the lumbar spine until she does physical therapy. Plan:  
 
  
Visit Diagnoses Vertigo    -  Primary Relevant Medications · Diclofenac Potassium (CAMBIA) 50 mg pwpk Other Relevant Orders · REFERRAL TO PHYSICAL THERAPY for vestibular rehab Migraine without aura and without status migrainosus, not intractable Relevant Medications · Diclofenac Potassium (CAMBIA) 50 mg pwpk · Imitrex to take as needed · Zofran to take as needed Other Relevant Orders · Encouraged migraine diary Lumbar stenosis · MRI lumbar spine denied by insurance · We will continue to monitor Post concussive syndrome Relevant Medications · Diclofenac Potassium (CAMBIA) 50 mg pwpk Other Relevant Orders · REFERRAL TO PHYSICAL THERAPY for vestibular rehab/postconcussive rehab Follow-up in 3 months Signed: 
Rosalia Ayon MD 
7/30/2018 Medications and side effects discussed with patient in detail. With any new medications prescribed, patient was given instructions on administration and side effects. Written medication information was provided to the patient as well. This note was created using voice recognition software. Despite editing, there may be syntax errors. This note will not be viewable in [address] Chief Complaint Patient presents with  Migraine 1. Have you been to the ER, urgent care clinic since your last visit? Hospitalized since your last visit? No 
 
2. Have you seen or consulted any other health care providers outside of the [address] [place] [address] since your last visit? Include any pap smears or colon screening. No  
 
Visit Vitals  /68  Pulse 64  Resp 16  
 Ht 5' 6\" (1.676 m)  Wt 66.2 kg (146 lb)  SpO2 98%  BMI 23.57 kg/m2

## 2018-07-30 NOTE — PATIENT INSTRUCTIONS
PRESCRIPTION REFILL POLICY [facility] Statement to Patients April 1, 2014 In an effort to ensure the large volume of patient prescription refills is processed in the most efficient and expeditious manner, we are asking our patients to assist us by calling your Pharmacy for all prescription refills, this will include also your  Mail Order Pharmacy. The pharmacy will contact our office electronically to continue the refill process. Please do not wait until the last minute to call your pharmacy. We need at least 48 hours (2days) to fill prescriptions. We also encourage you to call your pharmacy before going to  your prescription to make sure it is ready. With regard to controlled substance prescription refill requests (narcotic refills) that need to be picked up at our office, we ask your cooperation by providing us with at least 72 hours (3days) notice that you will need a refill. We will not refill narcotic prescription refill requests after 4:00pm on any weekday, Monday through Thursday, or after 2:00pm on Fridays, or on the weekends. We encourage everyone to explore another way of getting your prescription refill request processed using [employer], our patient web portal through our electronic medical record system. [employer] is an efficient and effective way to communicate your medication request directly to the office and  downloadable as an rony on your smart phone . [employer] also features a review functionality that allows you to view your medication list as well as leave messages for your physician. Are you ready to get connected? If so please review the attatched instructions or speak to any of our staff to get you set up right away! Thank you so much for your cooperation. Should you have any questions please contact our Practice Administrator. The Physicians and Staff,  [facility] Patient Instruction Plan/ Result Policy If we have ordered testing for you, know that; \"NO NEWS IS GOOD NEWS! \" It is our policy that we know longer call patients with results, nor do we  give test results over the phone. We schedule follow up appointments so that your results can be discussed in person. This allows you to address any questions you have regarding the results. If something of concern is revealed on your test, we will contact you to discuss the matter and if needed schedule a sooner follow up appointment. Additionally, results may be found by using the My Chart feature and one of our patient service representatives at the  can give you instructions on how to access this feature to utilize our electronic medical record system. Thank you for your understanding. Patient Instructions/Plans: 
Diclofenac (By mouth) Diclofenac (dye-KLOE-fen-ak) Treats pain. Also treats migraines. This medicine is an NSAID. Brand Name(s): Cambia, Cataflam, DermaSilkRx Anodynexa Geovanni, DermaSilkRx [employer], DermacinRx Jorge, Cumeira, NuDiclo TabPAK, PrevidolRx Analgesic Geovanni, PrevidolRx Plus Analgesic Geovanni, Voltaren, Zipsor, [employer] There may be other brand names for this medicine. When This Medicine Should Not Be Used: This medicine is not right for everyone. Do not use it if you had an allergic reaction to diclofenac, aspirin, or another NSAID. How to Use This Medicine:  
Capsule, Liquid, Tablet, Coated Tablet, Long Acting Tablet · Your doctor will tell you how much medicine to use. Do not use more than directed. · This medicine should come with a Medication Guide. Ask your pharmacist for a copy if you do not have one. · Oral solution: Mix the packet contents with 1 to 2 ounces (30 to 60 mL) of water. Do not use any liquid other than water for mixing the medicine. Mix well and drink it immediately on an empty stomach. · Missed dose: Take a dose as soon as you remember.  If it is almost time for your next dose, wait until then and take a regular dose. Do not take extra medicine to make up for a missed dose. · Store the medicine in a closed container at room temperature, away from heat, moisture, and direct light. . 
Drugs and Foods to Avoid: Ask your doctor or pharmacist before using any other medicine, including over-the-counter medicines, vitamins, and herbal products. · Do not use any other NSAID unless your doctor says it is okay. Some other NSAIDs are aspirin, diflunisal, ibuprofen, naproxen, or salsalate. · Some medicines and foods can affect how diclofenac works. Tell your doctor if you are also using any of the following: ¨ Cyclosporine, digoxin, lithium, methotrexate, pemetrexed ¨ Blood pressure medicine ¨ Blood thinner (such as warfarin) ¨ Diuretic (water pill) ¨ Medicine to treat depression Shamir.Lax Steroid medicine Warnings While Using This Medicine: · Tell your doctor if you are pregnant or breastfeeding. Do not use this medicine during the later part of a pregnancy, unless your doctor tells you to. · Tell your doctor if you have kidney disease, liver disease, asthma, heart failure, high blood pressure, or heart or blood vessel problems, or a history of stomach ulcers or bleeding problems. Tell your doctor if you have phenylketonuria (PKU). Also tell your doctor if you drink alcohol. · This medicine may cause the following problems: 
¨ Increased risk of heart attack, heart failure, or stroke ¨ Bleeding in your stomach or intestines ¨ Liver problems ¨ Serious skin reactions · Your headaches may become worse if you use a headache medicine for 10 or more days per month. Write down how often your headaches occur and how often you use this medicine. · Your doctor will do lab tests at regular visits to check on the effects of this medicine. Keep all appointments. · Keep all medicine out of the reach of children. Never share your medicine with anyone.  
Possible Side Effects While Using This Medicine:  
Call your doctor right away if you notice any of these side effects: · Allergic reaction: Itching or hives, swelling in your face or hands, swelling or tingling in your mouth or throat, chest tightness, trouble breathing · Blistering, peeling, or red skin rash · Bloody or black, tarry stools, severe stomach pain, vomiting blood or material that looks like coffee grounds · Change in how much or how often you urinate · Chest pain that may spread to your arms, jaw, back, or neck, trouble breathing, unusual sweating, faintness · Dark urine or pale stools, nausea, vomiting, loss of appetite, stomach pain, yellow skin or eyes · Numbness or weakness in your arm or leg, or on one side of your body, pain in your lower leg, sudden or severe headache, or problems with vision, speech, or walking · Rapid weight gain, swelling in your hands, ankles, or feet If you notice these less serious side effects, talk with your doctor: · Constipation or diarrhea · Mild headache If you notice other side effects that you think are caused by this medicine, tell your doctor. Call your doctor for medical advice about side effects. You may report side effects to FDA at 6-443-FDA-6552 © 2017 [address] Information is for End User's use only and may not be sold, redistributed or otherwise used for commercial purposes. The above information is an  only. It is not intended as medical advice for individual conditions or treatments. Talk to your doctor, nurse or pharmacist before following any medical regimen to see if it is safe and effective for you.

## 2018-07-30 NOTE — MR AVS SNAPSHOT
[address] [address] [id number] [place] [address] 
[phone] Patient: Cristobal Patel MRN: [id number] TET:3/95/6887 Visit Information Date & Time Provider Department Dept. Phone Encounter #  
 7/30/2018  3:20 PM Pj Ramires MD [facility] [phone] [id number] Follow-up Instructions Return in about 3 months (around 10/30/2018). Upcoming Health Maintenance Date Due DTaP/Tdap/Td series (1 - Tdap) 7/19/1998 PAP AKA CERVICAL CYTOLOGY 7/19/1998 Influenza Age 5 to Adult 8/1/2018 Allergies as of 7/30/2018  Review Complete On: 7/30/2018 By: Pj Ramires MD  
 No Known Allergies Current Immunizations  Never Reviewed No immunizations on file. Not reviewed this visit You Were Diagnosed With   
  
 Codes Comments Vertigo    -  Primary ICD-10-CM: W06 ICD-9-CM: 780.4 Migraine without aura and without status migrainosus, not intractable     ICD-10-CM: Z70.849 ICD-9-CM: 346.10 Post concussive syndrome     ICD-10-CM: F07.81 ICD-9-CM: 310.2 Vitals BP Pulse Resp Height(growth percentile) Weight(growth percentile) SpO2  
 108/68 64 16 5' 6\" (1.676 m) 146 lb (66.2 kg) 98% BMI Smoking Status 23.57 kg/m2 Never Smoker BMI and BSA Data Body Mass Index Body Surface Area  
 23.57 kg/m 2 1.76 m 2 Preferred Pharmacy Pharmacy Name Phone Simeon Mcclain [address] [facility], [address] [phone] Your Updated Medication List  
  
   
This list is accurate as of 7/30/18  4:02 PM.  Always use your most recent med list.  
  
  
  
  
 ALEVE 220 mg tablet Generic drug:  naproxen sodium Take 220 mg by mouth as needed. CYMBALTA 30 mg capsule Generic drug:  DULoxetine Take 30 mg by mouth daily. Diclofenac Potassium 50 mg Pwpk Commonly known as:  CAMBIA Take 1 Package by mouth daily as needed. levothyroxine 75 mcg tablet Commonly known as:  SYNTHROID Take  by mouth Daily (before breakfast). melatonin 3 mg tablet Take 1 mg by mouth nightly. multivitamin tablet Commonly known as:  ONE A DAY Take 1 Tab by mouth daily. PROBIOTIC 4X 10-15 mg Tbec Generic drug:  B.infantis-B.ani-B.long-B.bifi Take  by mouth. SUMAtriptan 100 mg tablet Commonly known as:  IMITREX Take 1 Tab by mouth once as needed for Migraine for up to 1 dose. We Performed the Following REFERRAL TO PHYSICAL THERAPY [BHF47 Custom] Comments:  
 [facility] for vestibular rehab. Follow-up Instructions Return in about 3 months (around 10/30/2018). Referral Information Referral ID Referred By Referred To  
  
 [id number] Maximiliano Deras Not Available Visits Status Start Date End Date 1 New Request 7/30/18 7/30/19 If your referral has a status of pending review or denied, additional information will be sent to support the outcome of this decision. Patient Instructions PRESCRIPTION REFILL POLICY [facility] Statement to Patients April 1, 2014 In an effort to ensure the large volume of patient prescription refills is processed in the most efficient and expeditious manner, we are asking our patients to assist us by calling your Pharmacy for all prescription refills, this will include also your  Mail Order Pharmacy. The pharmacy will contact our office electronically to continue the refill process. Please do not wait until the last minute to call your pharmacy. We need at least 48 hours (2days) to fill prescriptions. We also encourage you to call your pharmacy before going to  your prescription to make sure it is ready.   
 
With regard to controlled substance prescription refill requests (narcotic refills) that need to be picked up at our office, we ask your cooperation by providing us with at least 72 hours (3days) notice that you will need a refill. We will not refill narcotic prescription refill requests after 4:00pm on any weekday, Monday through Thursday, or after 2:00pm on Fridays, or on the weekends. We encourage everyone to explore another way of getting your prescription refill request processed using [facility], our patient web portal through our electronic medical record system. [facility] is an efficient and effective way to communicate your medication request directly to the office and  downloadable as an rony on your smart phone . [facility] also features a review functionality that allows you to view your medication list as well as leave messages for your physician. Are you ready to get connected? If so please review the attatched instructions or speak to any of our staff to get you set up right away! Thank you so much for your cooperation. Should you have any questions please contact our Practice Administrator. The Physicians and Staff,  [facility] Neurology [facility] Patient Instruction Plan/ Result Policy If we have ordered testing for you, know that; \"NO NEWS IS GOOD NEWS! \" It is our policy that we know longer call patients with results, nor do we  give test results over the phone. We schedule follow up appointments so that your results can be discussed in person. This allows you to address any questions you have regarding the results. If something of concern is revealed on your test, we will contact you to discuss the matter and if needed schedule a sooner follow up appointment. Additionally, results may be found by using the My Chart feature and one of our patient service representatives at the  can give you instructions on how to access this feature to utilize our electronic medical record system. Thank you for your understanding. Patient Instructions/Plans: 
Diclofenac (By mouth) Diclofenac (dye-KLOE-fen-ak) Treats pain. Also treats migraines. This medicine is an NSAID. Brand Name(s): Cambia, Cataflam, DermaSilkRx Anodynexa Geovanni, DermaSilkRx [employer], DermacinRx Jorge, Cumeira, NuDiclo TabPAK, PrevidolRx Analgesic Geovanni, PrevidolRx Plus Analgesic Geovanni, Voltaren, Zipsor, [employer] There may be other brand names for this medicine. When This Medicine Should Not Be Used: This medicine is not right for everyone. Do not use it if you had an allergic reaction to diclofenac, aspirin, or another NSAID. How to Use This Medicine:  
Capsule, Liquid, Tablet, Coated Tablet, Long Acting Tablet · Your doctor will tell you how much medicine to use. Do not use more than directed. · This medicine should come with a Medication Guide. Ask your pharmacist for a copy if you do not have one. · Oral solution: Mix the packet contents with 1 to 2 ounces (30 to 60 mL) of water. Do not use any liquid other than water for mixing the medicine. Mix well and drink it immediately on an empty stomach. · Missed dose: Take a dose as soon as you remember. If it is almost time for your next dose, wait until then and take a regular dose. Do not take extra medicine to make up for a missed dose. · Store the medicine in a closed container at room temperature, away from heat, moisture, and direct light. . 
Drugs and Foods to Avoid: Ask your doctor or pharmacist before using any other medicine, including over-the-counter medicines, vitamins, and herbal products. · Do not use any other NSAID unless your doctor says it is okay. Some other NSAIDs are aspirin, diflunisal, ibuprofen, naproxen, or salsalate. · Some medicines and foods can affect how diclofenac works. Tell your doctor if you are also using any of the following: ¨ Cyclosporine, digoxin, lithium, methotrexate, pemetrexed ¨ Blood pressure medicine ¨ Blood thinner (such as warfarin) ¨ Diuretic (water pill) ¨ Medicine to treat depression Fern.Abraham Steroid medicine Warnings While Using This Medicine: · Tell your doctor if you are pregnant or breastfeeding. Do not use this medicine during the later part of a pregnancy, unless your doctor tells you to. · Tell your doctor if you have kidney disease, liver disease, asthma, heart failure, high blood pressure, or heart or blood vessel problems, or a history of stomach ulcers or bleeding problems. Tell your doctor if you have phenylketonuria (PKU). Also tell your doctor if you drink alcohol. · This medicine may cause the following problems: 
¨ Increased risk of heart attack, heart failure, or stroke ¨ Bleeding in your stomach or intestines ¨ Liver problems ¨ Serious skin reactions · Your headaches may become worse if you use a headache medicine for 10 or more days per month. Write down how often your headaches occur and how often you use this medicine. · Your doctor will do lab tests at regular visits to check on the effects of this medicine. Keep all appointments. · Keep all medicine out of the reach of children. Never share your medicine with anyone. Possible Side Effects While Using This Medicine:  
Call your doctor right away if you notice any of these side effects: · Allergic reaction: Itching or hives, swelling in your face or hands, swelling or tingling in your mouth or throat, chest tightness, trouble breathing · Blistering, peeling, or red skin rash · Bloody or black, tarry stools, severe stomach pain, vomiting blood or material that looks like coffee grounds · Change in how much or how often you urinate · Chest pain that may spread to your arms, jaw, back, or neck, trouble breathing, unusual sweating, faintness · Dark urine or pale stools, nausea, vomiting, loss of appetite, stomach pain, yellow skin or eyes · Numbness or weakness in your arm or leg, or on one side of your body, pain in your lower leg, sudden or severe headache, or problems with vision, speech, or walking · Rapid weight gain, swelling in your hands, ankles, or feet If you notice these less serious side effects, talk with your doctor: · Constipation or diarrhea · Mild headache If you notice other side effects that you think are caused by this medicine, tell your doctor. Call your doctor for medical advice about side effects. You may report side effects to FDA at 8-023-JER-7942 © 2017 [address] Information is for End User's use only and may not be sold, redistributed or otherwise used for commercial purposes. The above information is an  only. It is not intended as medical advice for individual conditions or treatments. Talk to your doctor, nurse or pharmacist before following any medical regimen to see if it is safe and effective for you. Introducing \[facility] [place] [facility]\"" & HEALTH SERVICES! Dear Pattie Mendes: Thank you for requesting a [employer] account. Our records indicate that you already have an active [email or web address] account. You can access your account anytime at [email or web address] Did you know that you can access your hospital and ER discharge instructions at any time in [email or web address]? You can also review all of your test results from your hospital stay or ER visit. Additional Information If you have questions, please visit the Frequently Asked Questions section of the [email or web address] website at [email or web address] Remember, [email or web address] is NOT to be used for urgent needs. For medical emergencies, dial 911. Now available from your iPhone and Android! Please provide this summary of care documentation to your next provider. Your primary care clinician is listed as Abad Horan. If you have any questions after today's visit, please call [phone].

## 2018-07-30 NOTE — PROGRESS NOTES
Chief Complaint Patient presents with  Migraine 1. Have you been to the ER, urgent care clinic since your last visit? Hospitalized since your last visit? No 
 
2. Have you seen or consulted any other health care providers outside of the [address] [place] [address] since your last visit? Include any pap smears or colon screening. No  
 
Visit Vitals  /68  Pulse 64  Resp 16  
 Ht 5' 6\" (1.676 m)  Wt 66.2 kg (146 lb)  SpO2 98%  BMI 23.57 kg/m2

## 2018-08-20 LAB — MAMMOGRAPHY, EXTERNAL: NORMAL

## 2019-03-20 ENCOUNTER — TELEPHONE (OUTPATIENT)
Dept: NEUROLOGY | Age: 44
End: 2019-03-20

## 2019-03-20 NOTE — TELEPHONE ENCOUNTER
----- Message from Yuliet Dubon sent at 3/20/2019  8:44 AM EDT -----  Regarding: Dr. Claudia Aceves  Pt is requesting a call back from Dr. Stanley Valverde in reference to recommendations for neurologist in the practice & medication \"Zofran\". Pt is requesting for medication \"zofran\" to be sent to [address] [place] [address] [phone]. Best contact [phone].

## 2019-03-21 RX ORDER — ONDANSETRON 4 MG/1
4 TABLET, FILM COATED ORAL
Qty: 30 TAB | Refills: 2 | Status: SHIPPED | OUTPATIENT
Start: 2019-03-21

## 2019-03-21 NOTE — TELEPHONE ENCOUNTER
Order placed for Zofran 4 mg , PO, per Verbal Order from Dr. Stanley Valverde on 3/21/2019 due to nausea.

## 2019-03-21 NOTE — TELEPHONE ENCOUNTER
Its fine to refill the Zofran assuming she makes a follow-up appointment with 1 of the other providers.

## 2019-03-21 NOTE — TELEPHONE ENCOUNTER
Lm on VM that patient medication was sent into the pharmacy, however she needs an appt. For additional refills and she will have to make that with a different provider as Dr. Kiran last day is tomorrow.

## 2019-09-21 LAB — CREATININE, EXTERNAL: 0.74

## 2020-07-29 VITALS — HEIGHT: 66 IN | BODY MASS INDEX: 27.16 KG/M2 | WEIGHT: 169 LBS

## 2020-07-29 PROBLEM — E03.9 HYPOTHYROIDISM: Status: ACTIVE | Noted: 2020-07-29

## 2020-07-29 PROBLEM — M79.2 PERIPHERAL NEUROPATHIC PAIN: Status: ACTIVE | Noted: 2020-07-29

## 2020-07-29 PROBLEM — R60.9 EDEMA: Status: ACTIVE | Noted: 2020-07-29

## 2020-07-29 PROBLEM — G43.909 MIGRAINE: Status: ACTIVE | Noted: 2020-07-29

## 2020-07-29 PROBLEM — E04.1 THYROID NODULE: Status: ACTIVE | Noted: 2020-07-29

## 2020-07-29 PROBLEM — E55.9 VITAMIN D DEFICIENCY: Status: ACTIVE | Noted: 2020-07-29

## 2020-07-29 RX ORDER — PREDNISONE 20 MG/1
TABLET ORAL
COMMUNITY
End: 2021-06-30

## 2020-07-29 RX ORDER — IBUPROFEN 800 MG/1
TABLET ORAL
COMMUNITY
End: 2021-06-30 | Stop reason: ALTCHOICE

## 2020-07-29 RX ORDER — ACETAMINOPHEN 500 MG
TABLET ORAL 2 TIMES DAILY
COMMUNITY

## 2020-07-29 RX ORDER — PROPRANOLOL HYDROCHLORIDE 10 MG/1
TABLET ORAL 3 TIMES DAILY
COMMUNITY
End: 2021-08-04

## 2020-07-29 RX ORDER — ALBUTEROL SULFATE 90 UG/1
AEROSOL, METERED RESPIRATORY (INHALATION)
COMMUNITY

## 2020-07-29 RX ORDER — ESCITALOPRAM OXALATE 20 MG/1
20 TABLET ORAL DAILY
COMMUNITY

## 2020-07-29 RX ORDER — CETIRIZINE HCL 10 MG
TABLET ORAL
COMMUNITY
End: 2021-06-30 | Stop reason: ALTCHOICE

## 2020-07-29 RX ORDER — BECLOMETHASONE DIPROPIONATE HFA 80 UG/1
1 AEROSOL, METERED RESPIRATORY (INHALATION)
COMMUNITY
End: 2021-06-30 | Stop reason: ALTCHOICE

## 2021-06-30 ENCOUNTER — OFFICE VISIT (OUTPATIENT)
Dept: NEUROLOGY | Age: 46
End: 2021-06-30
Payer: MEDICARE

## 2021-06-30 VITALS
HEIGHT: 66 IN | HEART RATE: 63 BPM | BODY MASS INDEX: 26.36 KG/M2 | WEIGHT: 164 LBS | SYSTOLIC BLOOD PRESSURE: 134 MMHG | DIASTOLIC BLOOD PRESSURE: 82 MMHG | OXYGEN SATURATION: 99 % | RESPIRATION RATE: 14 BRPM

## 2021-06-30 DIAGNOSIS — R47.89 ALTERATION IN SPEECH: ICD-10-CM

## 2021-06-30 DIAGNOSIS — R13.10 DYSPHAGIA, UNSPECIFIED TYPE: ICD-10-CM

## 2021-06-30 DIAGNOSIS — F41.9 ANXIETY: ICD-10-CM

## 2021-06-30 DIAGNOSIS — R20.0 RIGHT SIDED NUMBNESS: Primary | ICD-10-CM

## 2021-06-30 DIAGNOSIS — R41.89 COGNITIVE IMPAIRMENT: ICD-10-CM

## 2021-06-30 DIAGNOSIS — F07.81 POST CONCUSSIVE SYNDROME: ICD-10-CM

## 2021-06-30 PROCEDURE — 99214 OFFICE O/P EST MOD 30 MIN: CPT | Performed by: NURSE PRACTITIONER

## 2021-06-30 RX ORDER — RIMEGEPANT SULFATE 75 MG/75MG
75 TABLET, ORALLY DISINTEGRATING ORAL
COMMUNITY

## 2021-06-30 RX ORDER — BUSPIRONE HYDROCHLORIDE 10 MG/1
10 TABLET ORAL 3 TIMES DAILY
Qty: 90 TABLET | Refills: 1 | Status: SHIPPED | OUTPATIENT
Start: 2021-06-30

## 2021-06-30 NOTE — PROGRESS NOTES
Chief Complaint   Patient presents with    Numbness      right sided numbness and trouble swallowing ( feels right side of throat is asleep)     Other     nerve zings mostly on right side      Visit Vitals  /82 (BP 1 Location: Left arm, BP Patient Position: Sitting)   Pulse 63   Resp 14   Ht 5' 6\" (1.676 m)   Wt 74.4 kg (164 lb)   SpO2 99%   BMI 26.47 kg/m²

## 2021-06-30 NOTE — PROGRESS NOTES
[address] [facility],5[address]  [address]   [place] [address] [facility] [address] [place] [address] [address]   [phone] Office   [phone] Fax           Date:  21     Name:  Tammie Romero  :  1975  MRN:  [id number]     PCP:  Fredi Banda MD    Chief Complaint   Patient presents with    Numbness      right sided numbness and trouble swallowing ( feels right side of throat is asleep)     Other     nerve zings mostly on right side        HISTORY OF PRESENT ILLNESS: Ms. Carolina Salguero presents today for further evaluation of right sided numbness, zinging sensations mostly right sided, and trouble swallowing. She was seen about three years ago by Dr. Cong Mariee who was treating her for headaches and post concussive syndrome. Currently, she is being treated by PMR at [address] [place] [address]. She returns today with one week history of right sided heaviness. She indicates that this started off with a zinging sensation in the right posterior leg. The next thing she noticed was numbness in the right hand. She demonstrates that she has numbness on the entire right side, like someone primo a line down the center of her body. Numbness was severe enough that she bit the right side of her tongue and did not notice this until she tasted the blood in her mouth. She reports some difficulty swallowing due to the right side of her throat feeling asleep. She has noticed a slight lisp when talking. She is also noticing nerve zinging that is mostly on the right but can occur anywhere on her body. Cognitively, she does not feel as sharp and is easily distracted. She does acknowledge anxiety and admits that the week prior to these issues she had a particularly stressful week. Concussion history:   2019: sitting in a folding lawn chair with her dog's leash wrapped around her waist while with friends and family at the fire pit in her yard when the dog decided to run off after something. Dog pulled her down and she hit the left side of her head. Later that evening, she tripped over a stick one of her children was using to poke the fire while chasing the youngest in play. Lorena Debar and hit the other side of her head. Went to bed that night and woke with headache. Was not getting better so went to the ER three weeks later with severe headache, nausea. July, 2018: She was on vacation and hit her head on a TV on the wall. She got dizziness and nausea immediately. She had to go right to bed. She slept all day. She took ibuprofen whenever she woke up. January, 2017: She was on a sking trip and had been on a 5 hour car ride. She got out of the car and the door swung back and hit her. She was dizzy, nauseaous, had severe headache. She didn't lose consciousness. She went to the ED and had a CT head. This was negative. She was diagnosed with concussion. October, 2016: They were at an event when she was crawling out of her [employer] and fell out and hit the back of her head on the arm of a folding chair. She had severe pain where she hit. She had severe headache. She didn't realize she might have had a concussion. MRI of the brain exam date 5/23/18. Result: Normal MRI of the brain with and without contrast. (see media for full report)    Labs 06/25/2021: LEIGHA, Lyme, Babesia Microti AB panel, A. phagocytophilum PCR, B12, Ferritin, PTH Intact, Mg, Sed rate, TSH, free T4, CMP, and CBC: all WNL (see media for full report)      FH of mom with Lupus, aunt with MS, aunt with RA, brother and cousins with Dieter Ashraf. Current Outpatient Medications   Medication Sig    rimegepant (Nurtec ODT) 75 mg disintegrating tablet Take 75 mg by mouth once as needed for Migraine.  levothyroxine (SYNTHROID) 75 mcg tablet TAKE ONE TABLET BY MOUTH EVERY MORNING ON AN EMPTY STOMACH    albuterol (PROVENTIL HFA, VENTOLIN HFA, PROAIR HFA) 90 mcg/actuation inhaler Take  by inhalation.     escitalopram oxalate (LEXAPRO) 20 mg tablet Take 20 mg by mouth daily.  MAGNESIUM PO Take  by mouth.  propranoloL (INDERAL) 10 mg tablet Take  by mouth three (3) times daily.  acetaminophen (TYLENOL PO) Take  by mouth.  cholecalciferol (VITAMIN D3) (2,000 UNITS /50 MCG) cap capsule Take  by mouth two (2) times a day.  ondansetron hcl (ZOFRAN) 4 mg tablet Take 1 Tab by mouth every eight (8) hours as needed for Nausea.  melatonin 3 mg tablet Take 1 mg by mouth nightly.  DULoxetine (CYMBALTA) 30 mg capsule Take 30 mg by mouth daily.  B.infantis-B.ani-B.long-B.bifi (PROBIOTIC 4X) 10-15 mg TbEC Take  by mouth. No current facility-administered medications for this visit.      Allergies   Allergen Reactions    Grass Pollen Unknown (comments)     Past Medical History:   Diagnosis Date    Anxiety     Concussion     -    Depression     Thyroid disease      Past Surgical History:   Procedure Laterality Date    HX ORTHOPAEDIC      laminectomy x2 [id number]    HX OTHER SURGICAL  2017    dental implant #19    OH REPAIR OF LAMINECTOMY DEFECT, ADD-ON      x2      Social History     Socioeconomic History    Marital status:      Spouse name: Not on file    Number of children: Not on file    Years of education: Not on file    Highest education level: Not on file   Occupational History    Not on file   Tobacco Use    Smoking status: Former Smoker     Years: 5.00     Quit date:      Years since quittin.5    Smokeless tobacco: Never Used    Tobacco comment: 1 PPW   Substance and Sexual Activity    Alcohol use: Not on file     Comment: with dinner  day    Drug use: No    Sexual activity: Yes     Partners: Male     Comment:  has had vasectomy   Other Topics Concern    Not on file   Social History Narrative    Not on file     Social Determinants of Health     Financial Resource Strain:     Difficulty of Paying Living Expenses:    Food Insecurity:     Worried About Running Out of [place]rivi in the Last Year:    [address] in the Last Year:    Transportation Needs:     Lack of Transportation (Medical):  Lack of Transportation (Non-Medical):    Physical Activity:     Days of Exercise per Week:     Minutes of Exercise per Session:    Stress:     Feeling of Stress :    Social Connections:     Frequency of Communication with Friends and Family:     Frequency of Social Gatherings with Friends and Family:     Attends Mosque Services:     Active Member of Clubs or Organizations:     Attends Club or Organization Meetings:     Marital Status:    Intimate Partner Violence:     Fear of Current or Ex-Partner:     Emotionally Abused:     Physically Abused:     Sexually Abused:      Family History   Problem Relation Age of Onset    Hypertension Father     Cancer Maternal Grandfather         myeloma       PHYSICAL EXAMINATION:    Visit Vitals  /82 (BP 1 Location: Left arm, BP Patient Position: Sitting)   Pulse 63   Resp 14   Ht 5' 6\" (1.676 m)   Wt 74.4 kg (164 lb)   SpO2 99%   BMI 26.47 kg/m²     General:  Well defined, nourished, and groomed individual in no acute distress. Neck: Supple, nontender, no bruits, no pain with resistance to active range of motion. Heart: Regular rate and rhythm, no murmurs, rub, or gallop. Normal S1S2. Lungs:  Clear to auscultation bilaterally with equal chest expansion, no cough, no wheeze  Musculoskeletal:  Extremities revealed no edema and had full range of motion of joints. Psych:  Good mood and bright affect    NEUROLOGICAL EXAMINATION:     Mental Status:   Alert and oriented to person, place, and time with recent and remote memory intact. Attention span and concentration are normal. Speech is fluent with a full fund of knowledge.       Cranial Nerves:  I: smell Not tested   II: visual fields Full to confrontation   II: pupils Equal, round, reactive to light   II: optic disc No papilledema   III,VII: ptosis none   III,IV,VI: extraocular muscles  Full ROM   V: mastication normal   V: facial light touch sensation  normal   VII: facial muscle function   symmetric   VIII: hearing symmetric   IX: soft palate elevation  normal   XI: trapezius strength  5/5   XI: sternocleidomastoid strength 5/5   XI: neck flexion strength  5/5   XII: tongue  midline     Motor Examination: Normal tone, bulk, and strength, 5/5 muscle strength throughout. Sensory exam:  Decreased sensation to all modalities on the right versus the left  Coordination:  Finger to nose testing was normal.   No resting or intention tremor  Gait and Station:  Steady while walking. Normal arm swing. No pronator drift. No muscle wasting or fasiculations noted. Reflexes:  DTRs 2+ throughout. ASSESSMENT AND PLAN    ICD-10-CM ICD-9-CM    1. Right sided numbness  R20.0 782.0 MRI BRAIN W WO CONT   2. Alteration in speech  R47.89 784.59 MRI BRAIN W WO CONT   3. Dysphagia, unspecified type  R13.10 787.20 MRI BRAIN W WO CONT   4. Anxiety  F41.9 300.00 busPIRone (BUSPAR) 10 mg tablet      REFERRAL TO NEUROPSYCHOLOGY   5. Cognitive impairment  R41.89 294.9 MRI BRAIN W WO CONT      REFERRAL TO NEUROPSYCHOLOGY   6. Post concussive syndrome  F07.81 310.2 REFERRAL TO NEUROPSYCHOLOGY      REFERRAL TO NEUROLOGY     Hemibody numbness, tingling, and heaviness with accompanying random zinging sensations is, in my experience, most often related to anxiety and we discussed this. Interestingly, this actually made her feel a little better. Of course, this is more of a diagnosis of exclusion so I think it is prudent to rule out any significant structural or cerebrovascular issue and she will be scheduled for an MRI of the brain. In the meantime, she was started on Buspar to help with the anxiety.  Given her significant history of TBI, recommended she be seen by Dr. Jean-Claude Pham who specializes in this issue and for her to have neurocognitive assessment, especially with the complaints of decreased focus and attention, to help determine which areas of neurocognitive rehab may be of most benefit to her. Follow up in four weeks. Kathie Rock

## 2021-07-08 ENCOUNTER — HOSPITAL ENCOUNTER (OUTPATIENT)
Dept: MRI IMAGING | Age: 46
Discharge: HOME | End: 2021-07-08
Attending: NURSE PRACTITIONER
Payer: COMMERCIAL

## 2021-07-08 VITALS — BODY MASS INDEX: 26.79 KG/M2 | WEIGHT: 166 LBS

## 2021-07-08 DIAGNOSIS — R41.89 COGNITIVE IMPAIRMENT: ICD-10-CM

## 2021-07-08 DIAGNOSIS — R13.10 DYSPHAGIA, UNSPECIFIED TYPE: ICD-10-CM

## 2021-07-08 DIAGNOSIS — R20.0 RIGHT SIDED NUMBNESS: ICD-10-CM

## 2021-07-08 DIAGNOSIS — R47.89 ALTERATION IN SPEECH: ICD-10-CM

## 2021-07-08 PROCEDURE — 70553 MRI BRAIN STEM W/O & W/DYE: CPT

## 2021-07-08 PROCEDURE — 74011250636 HC RX REV CODE- 250/636: Performed by: RADIOLOGY

## 2021-07-08 PROCEDURE — A9575 INJ GADOTERATE MEGLUMI 0.1ML: HCPCS | Performed by: RADIOLOGY

## 2021-07-08 RX ORDER — GADOTERATE MEGLUMINE 376.9 MG/ML
15 INJECTION INTRAVENOUS
Status: COMPLETED | OUTPATIENT
Start: 2021-07-08 | End: 2021-07-08

## 2021-07-08 RX ADMIN — GADOTERATE MEGLUMINE 15 ML: 376.9 INJECTION INTRAVENOUS at 18:35

## 2021-07-13 ENCOUNTER — TELEPHONE (OUTPATIENT)
Dept: NEUROLOGY | Age: 46
End: 2021-07-13

## 2021-07-13 NOTE — TELEPHONE ENCOUNTER
Pt called because she got a Eloquii message that her MRI results are back. When she opened Eloquii the message was blank so she wanted to see what that was about. Informed patient that the results were received but that Deon Franklin has not released them yet. Patient acknowledged understanding and stated that makes sense.  Best call back number for her: [phone]

## 2021-07-19 ENCOUNTER — TELEPHONE (OUTPATIENT)
Dept: NEUROLOGY | Age: 46
End: 2021-07-19

## 2021-07-19 NOTE — TELEPHONE ENCOUNTER
----- Message from [facility] sent at 7/19/2021  1:04 PM EDT -----  Regarding: Chaim STOVALL/Telephone  General Message/Vendor Calls    Caller's first and last name:  PT      Reason for call:  Results of Brain MRI done on Fri, 07/08/21      Callback required yes/no and why: Yes, Brain MRI results      Best contact number(s):   [phone]      Details to clarify the request:  PT stated, she received a message via her email advising there was a message in [address]; however, when reviewing MyChart no message was appeared. Pt is requesting a call back with the results of her Brain MRI.   Pt was advised her next appt is on Wed,08/04/21 at 10:30AM      [facility]

## 2021-08-04 ENCOUNTER — OFFICE VISIT (OUTPATIENT)
Dept: NEUROLOGY | Age: 46
End: 2021-08-04
Payer: COMMERCIAL

## 2021-08-04 VITALS
SYSTOLIC BLOOD PRESSURE: 122 MMHG | BODY MASS INDEX: 27.31 KG/M2 | WEIGHT: 169.9 LBS | RESPIRATION RATE: 17 BRPM | HEIGHT: 66 IN | OXYGEN SATURATION: 98 % | DIASTOLIC BLOOD PRESSURE: 80 MMHG | HEART RATE: 68 BPM

## 2021-08-04 DIAGNOSIS — M54.12 CERVICAL RADICULOPATHY: ICD-10-CM

## 2021-08-04 DIAGNOSIS — R29.818 TRANSIENT NEUROLOGICAL SYMPTOMS: ICD-10-CM

## 2021-08-04 DIAGNOSIS — M54.16 LUMBAR RADICULOPATHY: Primary | ICD-10-CM

## 2021-08-04 DIAGNOSIS — R19.8 ALTERATION IN BOWEL AND BLADDER FUNCTION: ICD-10-CM

## 2021-08-04 DIAGNOSIS — R39.89 ALTERATION IN BOWEL AND BLADDER FUNCTION: ICD-10-CM

## 2021-08-04 PROCEDURE — 99214 OFFICE O/P EST MOD 30 MIN: CPT | Performed by: NURSE PRACTITIONER

## 2021-08-04 RX ORDER — AMITRIPTYLINE HYDROCHLORIDE 25 MG/1
25 TABLET, FILM COATED ORAL
Qty: 30 TABLET | Refills: 2 | Status: SHIPPED | OUTPATIENT
Start: 2021-08-04

## 2021-08-04 NOTE — PROGRESS NOTES
[facility],5Th [address]  [address]   [address] [facility] [address] [place] [address] Drive   [phone] Office   [phone] Fax           Date:  21     Name:  Justine Sutton  :  1975  MRN:  [id number]     PCP:  Feliciano Yadav MD    Chief Complaint   Patient presents with    Numbness       HISTORY OF PRESENT ILLNESS: Ms. Sarah Hough presents today for follow up evaluation of right sided numbness, zinging sensations mostly right sided, and trouble swallowing. Right sided heaviness which started off with a zinging sensation in the right posterior leg and progressed to the right hand. She has numbness on the entire right side. While she does not have any overt weakness, she is having difficulty with walking as she does not trust her right leg. She is now experiencing pain in the right side of her head that will radiate down the right shoulder. In one episode of this, it came on suddenly and was associated with nausea. She took her Nurtec ODT and a Zofran and this did improve. She is having burning sensation in the right hand and sharp pain in the right index and thumb. She did have an episode of left sided flank spasm that she initially thought might be palpitations which has resolved. While she has no incontinence of bowel or bladder, she is reporting a loss of sensation when she urinates. She reports some difficulty swallowing for which she is going to see an ENT. Cognitively, she does not feel as sharp and is easily distracted. Symptoms are such that she is having a hard time working and feels that her quality of life is impaired. Concussion history:   2019: sitting in a folding lawn chair with her dog's leash wrapped around her waist while with friends and family at the fire pit in her yard when the dog decided to run off after something. Dog pulled her down and she hit the left side of her head.  Later that evening, she tripped over a stick one of her children was using to poke the fire while chasing the youngest in play. [place] and hit the other side of her head. Went to bed that night and woke with headache. Was not getting better so went to the ER three weeks later with severe headache, nausea. July, 2018: She was on vacation and hit her head on a TV on the wall. She got dizziness and nausea immediately. She had to go right to bed. She slept all day. She took ibuprofen whenever she woke up. January, 2017: She was on a sking trip and had been on a 5 hour car ride. She got out of the car and the door swung back and hit her. She was dizzy, nauseaous, had severe headache. She didn't lose consciousness. She went to the ED and had a CT head. This was negative. She was diagnosed with concussion. October, 2016: They were at an event when she was crawling out of her [place] and fell out and hit the back of her head on the arm of a folding chair. She had severe pain where she hit. She had severe headache. She didn't realize she might have had a concussion. MRI of the brain exam date 5/23/18. Result: Normal MRI of the brain with and without contrast. (see media for full report)    Labs 06/25/2021: LEIGHA, Lyme, Babesia Microti AB panel, A. phagocytophilum PCR, B12, Ferritin, PTH Intact, Mg, Sed rate, TSH, free T4, CMP, and CBC: all WNL (see media for full report)      FH of mom with Lupus, aunt with MS, aunt with RA, brother and cousins with Jennifer Mckinnon. Recap from LOV:  Hemibody numbness, tingling, and heaviness with accompanying random zinging sensations is, in my experience, most often related to anxiety and we discussed this. Interestingly, this actually made her feel a little better. Of course, this is more of a diagnosis of exclusion so I think it is prudent to rule out any significant structural or cerebrovascular issue and she will be scheduled for an MRI of the brain.  In the meantime, she was started on Buspar to help with the anxiety. Given her significant history of TBI, recommended she be seen by Dr. Michael Gurrola who specializes in this issue and for her to have neurocognitive assessment, especially with the complaints of decreased focus and attention, to help determine which areas of neurocognitive rehab may be of most benefit to her. Follow up in four weeks. Current Outpatient Medications   Medication Sig    rimegepant (Nurtec ODT) 75 mg disintegrating tablet Take 75 mg by mouth once as needed for Migraine.  busPIRone (BUSPAR) 10 mg tablet Take 1 Tablet by mouth three (3) times daily.  levothyroxine (SYNTHROID) 75 mcg tablet TAKE ONE TABLET BY MOUTH EVERY MORNING ON AN EMPTY STOMACH    albuterol (PROVENTIL HFA, VENTOLIN HFA, PROAIR HFA) 90 mcg/actuation inhaler Take  by inhalation.  escitalopram oxalate (LEXAPRO) 20 mg tablet Take 20 mg by mouth daily.  MAGNESIUM PO Take  by mouth.  cholecalciferol (VITAMIN D3) (2,000 UNITS /50 MCG) cap capsule Take  by mouth two (2) times a day.  ondansetron hcl (ZOFRAN) 4 mg tablet Take 1 Tab by mouth every eight (8) hours as needed for Nausea.  melatonin 3 mg tablet Take 10 mg by mouth nightly.  B.infantis-B.ani-B.long-B.bifi (PROBIOTIC 4X) 10-15 mg TbEC Take  by mouth.  propranoloL (INDERAL) 10 mg tablet Take  by mouth three (3) times daily. (Patient not taking: Reported on 8/4/2021)    acetaminophen (TYLENOL PO) Take  by mouth. (Patient not taking: Reported on 8/4/2021)    DULoxetine (CYMBALTA) 30 mg capsule Take 30 mg by mouth daily. (Patient not taking: Reported on 8/4/2021)     No current facility-administered medications for this visit.      Allergies   Allergen Reactions    Grass Pollen Unknown (comments)     Past Medical History:   Diagnosis Date    Anxiety     Concussion     09/20-9/21    Depression     Thyroid disease      Past Surgical History:   Procedure Laterality Date    HX ORTHOPAEDIC      laminectomy x2 [id number]    HX OTHER SURGICAL 2017    dental implant #19    [place] REPAIR OF LAMINECTOMY DEFECT, ADD-ON      x2      Social History     Socioeconomic History    Marital status:      Spouse name: Not on file    Number of children: Not on file    Years of education: Not on file    Highest education level: Not on file   Occupational History    Not on file   Tobacco Use    Smoking status: Former Smoker     Years: 5.00     Quit date:      Years since quittin.6    Smokeless tobacco: Never Used    Tobacco comment: 1 PPW   Substance and Sexual Activity    Alcohol use: Not on file     Comment: with dinner nereyda day    Drug use: No    Sexual activity: Yes     Partners: Male     Comment:  has had vasectomy   Other Topics Concern    Not on file   Social History Narrative    Not on file     Social Determinants of Health     Financial Resource Strain:     Difficulty of Paying Living Expenses:    Food Insecurity:     Worried About Running Out of Food in the Last Year:     [address] in the Last Year:    Transportation Needs:     Lack of Transportation (Medical):      Lack of Transportation (Non-Medical):    Physical Activity:     Days of Exercise per Week:     Minutes of Exercise per Session:    Stress:     Feeling of Stress :    Social Connections:     Frequency of Communication with Friends and Family:     Frequency of Social Gatherings with Friends and Family:     Attends Yazdanism Services:     Active Member of Clubs or Organizations:     Attends Club or Organization Meetings:     Marital Status:    Intimate Partner Violence:     Fear of Current or Ex-Partner:     Emotionally Abused:     Physically Abused:     Sexually Abused:      Family History   Problem Relation Age of Onset    Hypertension Father     Cancer Maternal Grandfather         myeloma       PHYSICAL EXAMINATION:    Visit Vitals  /80 (BP 1 Location: Left upper arm, BP Patient Position: Sitting, BP Cuff Size: Adult)   Pulse 68   Resp 17   Ht 5' 6\" (1.676 m)   Wt 77.1 kg (169 lb 14.4 oz)   SpO2 98%   BMI 27.42 kg/m²     General:  Well defined, nourished, and groomed individual in no acute distress. Neck: Supple, nontender, no bruits, no pain with resistance to active range of motion. Heart: Regular rate and rhythm, no murmurs, rub, or gallop. Normal S1S2. Lungs:  Clear to auscultation bilaterally with equal chest expansion, no cough, no wheeze  Musculoskeletal:  Extremities revealed no edema and had full range of motion of joints. Psych:  Good mood and bright affect    NEUROLOGICAL EXAMINATION:     Mental Status:   Alert and oriented to person, place, and time with recent and remote memory intact. Attention span and concentration are normal. Speech is fluent with a full fund of knowledge. Cranial Nerves:  I: smell Not tested   II: visual fields Full to confrontation   II: pupils Equal, round, reactive to light   II: optic disc No papilledema   III,VII: ptosis none   III,IV,VI: extraocular muscles  Full ROM   V: mastication normal   V: facial light touch sensation  normal   VII: facial muscle function   symmetric   VIII: hearing symmetric   IX: soft palate elevation  normal   XI: trapezius strength  5/5   XI: sternocleidomastoid strength 5/5   XI: neck flexion strength  5/5   XII: tongue  midline     Motor Examination: Normal tone, bulk, and strength, 5/5 muscle strength throughout. Sensory exam:  Decreased sensation to all modalities on the right versus the left  Coordination:  Finger to nose testing was normal.   No resting or intention tremor  Gait and Station:  Steady while walking. Normal arm swing. No pronator drift. No muscle wasting or fasiculations noted. Reflexes:  DTRs 2+ throughout. ASSESSMENT AND PLAN    ICD-10-CM ICD-9-CM    1. Lumbar radiculopathy  M54.16 724.4 MRI LUMB SPINE W WO CONT      amitriptyline (ELAVIL) 25 mg tablet      EMG NCV SENSORY PER NERVE   2.  Cervical radiculopathy  M54.12 723.4 MRI CERV SPINE W WO CONT      EMG NCV SENSORY PER NERVE   3. Alteration in bowel and bladder function  R19.8 787.99 MRI [facility] SPINE W WO CONT    R39.89 788.99    4. Transient neurological symptoms  R29.818 781.99 REFERRAL TO RHEUMATOLOGY     Ongoing complaints of right sided numbness with burning sensations in her right hand, sharp pain in the right index finger and thumb. Normal brain MRI. Will check for possible radiculopathy given previous history of lumbar surgery and degenerative disc disease of the cervical spine. Also had complaints of loss of sensation while urinating. Will check thoracic spine MRI as well. EMG right arm and leg. Still waiting to set up neurocognitive assessment. She has an appointment for consult with TBI specialist given significant history of concussion. Thus far, she has had a normal neurological work up and has a significant family history of autoimmune disorders. Recommended consultation with rheumatology. Kathie Kim

## 2021-08-04 NOTE — PROGRESS NOTES
Chief Complaint   Patient presents with    Numbness       Visit Vitals  /80 (BP 1 Location: Left upper arm, BP Patient Position: Sitting, BP Cuff Size: Adult)   Pulse 68   Resp 17   Ht 5' 6\" (1.676 m)   Wt 169 lb 14.4 oz (77.1 kg)   SpO2 98%   BMI 27.42 kg/m²       1. Have you been to the ER, urgent care clinic since your last visit? Hospitalized since your last visit? No    2. Have you seen or consulted any other health care providers outside of the [address] [place] [address] since your last visit? Include any pap smears or colon screening.  No

## 2021-08-16 ENCOUNTER — HOSPITAL ENCOUNTER (OUTPATIENT)
Dept: MRI IMAGING | Age: 46
Discharge: HOME | End: 2021-08-16
Attending: NURSE PRACTITIONER
Payer: COMMERCIAL

## 2021-08-16 DIAGNOSIS — R19.8 ALTERATION IN BOWEL AND BLADDER FUNCTION: ICD-10-CM

## 2021-08-16 DIAGNOSIS — M54.12 CERVICAL RADICULOPATHY: ICD-10-CM

## 2021-08-16 DIAGNOSIS — M54.16 LUMBAR RADICULOPATHY: ICD-10-CM

## 2021-08-16 DIAGNOSIS — R39.89 ALTERATION IN BOWEL AND BLADDER FUNCTION: ICD-10-CM

## 2021-08-16 PROCEDURE — A9576 INJ PROHANCE MULTIPACK: HCPCS | Performed by: NURSE PRACTITIONER

## 2021-08-16 PROCEDURE — 72157 MRI CHEST SPINE W/O & W/DYE: CPT

## 2021-08-16 PROCEDURE — 72158 MRI LUMBAR SPINE W/O & W/DYE: CPT

## 2021-08-16 PROCEDURE — 72156 MRI NECK SPINE W/O & W/DYE: CPT

## 2021-08-16 PROCEDURE — 74011636320 HC RX REV CODE- 636/320: Performed by: NURSE PRACTITIONER

## 2021-08-16 RX ADMIN — GADOTERIDOL 15 ML: 279.3 INJECTION, SOLUTION INTRAVENOUS at 18:00

## 2021-08-25 ENCOUNTER — TELEPHONE (OUTPATIENT)
Dept: NEUROLOGY | Age: 46
End: 2021-08-25

## 2021-08-25 NOTE — TELEPHONE ENCOUNTER
----- Message from Ruth Sandoval sent at 8/25/2021  4:29 PM EDT -----  Regarding: SIA Vargas  General Message/Vendor Calls    Caller's first and last name: Pt      Reason for call: MRI results      Callback required yes/no and why: Yes, to discuss MRI      Best contact number(s): [phone]      Details to clarify the request: Pt had an MRI performed on 08/16 and would like to know if the results are back yet. Please advise.        Ruth Sandoval

## 2021-08-26 ENCOUNTER — OFFICE VISIT (OUTPATIENT)
Dept: NEUROLOGY | Age: 46
End: 2021-08-26

## 2021-08-26 DIAGNOSIS — R20.0 NUMBNESS: Primary | ICD-10-CM

## 2021-08-26 NOTE — LETTER
2021 5:33 PM    Patient:  Wendi Vargas   YOB: 1975  Date of Visit: 2021      Dear Yareli Austin MD  [facility] [address]  Via Fax: [phone]: Thank you for referring Ms. Leatha Solis to me for EMG/NCS. EMG/ NCS Report  [facility] - INPATIENT  [address] [place], [address] [place], [address]   Ph: [phone]   FAX: [phone]/ [phone]  Test Date:  2019      Test Date:  2021    Patient: Leatha Solis : 1975 Physician: Bella Milian MD   Sex: Male Height: ' \" Ref Phys: Reggie Jaureguitist   ID#:  [id number] Weight:  lbs. Technician: Flor Estrada     Patient History / Exam:  Patient comes in with R hemisensory disturbance from head to foot with clear cut in the midline including numbness of R half of tongue. (-) weakness (+) stress with kids    Patient is coming for neuropathy evalaution        EMG & NCV Findings:  Evaluation of the right Fibular motor nerve showed normal distal onset latency (6.3 ms), normal amplitude (3.3 mV), normal conduction velocity (B Fib-Ankle, 41 m/s), and normal conduction velocity (Poplt-B Fib, 45 m/s). The right median motor nerve showed normal distal onset latency (3.6 ms), normal amplitude (7.2 mV), and normal conduction velocity (Elbow-Wrist, 61 m/s). The right tibial motor nerve showed normal distal onset latency (4.5 ms), normal amplitude (10.4 mV), and normal conduction velocity (Knee-Ankle, 45 m/s). The right ulnar motor nerve showed normal distal onset latency (2.8 ms), normal amplitude (10.2 mV), normal conduction velocity (B Elbow-Wrist, 69 m/s), and normal conduction velocity (A Elbow-B Elbow, 50 m/s). The right median sensory, the right radial sensory, the right sural sensory, and the right ulnar sensory nerves showed normal distal peak latency (R3.1, R2.2, R2.5, R2.8 ms) and normal amplitude (R56.4, R27.8, R4.3, R22.7 µV).   The right Sup Fibular sensory nerve showed normal distal peak latency (3.1 ms), normal amplitude (5.4 µV), and normal conduction velocity (Lower leg-Lat ankle, 42 m/s). All F Wave latencies were within normal limits. All examined muscles (as indicated in the following table) showed no evidence of electrical instability. Impression:    Extensive electrodiagnostic examination of the right upper and right lower extremities is normal.    Specifically, there is no evidence of a peripheral neuropathy, cervical motor radiculopathy or lumbosacral motor radiculopathy on the right.         Hema Juarez MD  Diplomate, American Board of Psychiatry and Neurology  Diplomate, Neuromuscular Medicine  Diplomate, American Board of Electrodiagnostic Medicine  Director, [address] [place] [address] Accredited Laboratory with Exemplary Status          Nerve Conduction Studies  Anti Sensory Summary Table     Stim Site NR Peak (ms) Norm Peak (ms) P-T Amp (µV) Norm P-T Amp Site1 Site2 Dist (cm)   Right Median Anti Sensory (2nd Digit)  32.7°C   Wrist    3.1 <4 56.4 >13 Wrist 2nd Digit 14.0   Right Radial Anti Sensory (Base 1st Digit)  23.5°C   Wrist    2.2 <2.8 27.8 >11 Wrist Base 1st Digit 10.0   Right Sup Fibular Anti Sensory (Lat ankle)  30.4°C   Lower leg    3.1 <4.5 5.4 >5 Lower leg Lat ankle 10.0   Right Sural Anti Sensory (Lat Mall)  31.3°C   Calf    2.5 <4.5 4.3 >4.0 Calf Lat Mall 14.0   Right Ulnar Anti Sensory (5th Digit)  32.3°C   Wrist    2.8 <4.0 22.7 >9 Wrist 5th Digit 14.0     Motor Summary Table     Stim Site NR Onset (ms) Norm Onset (ms) O-P Amp (mV) Norm O-P Amp Amp (Prev) (%) Site1 Site2 Dist (cm) Hever (m/s) Norm Hever (m/s)   Right Fibular Motor (Ext Dig Brev)  24.2°C   Ankle    6.3 <6.5 3.3 >2.6 100.0 Ankle Ext Dig Brev 8.0     B Fib    11.9  3.1  93.9 B Fib Ankle 23.0 41 >38   Poplt    14.1  2.9  93.5 Poplt B Fib 10.0 45 >42   Right Median Motor (Abd Poll Brev)  23.5°C   Wrist    3.6 <4.5 7.2 >4.1 100.0 Wrist Abd Poll Brev 8.0     Elbow    7.2  6.8 94.4 Elbow Wrist 22.0 61 >49   Right Tibial Motor (Abd Lao Brev)  29.8°C   Ankle    4.5 <6.1 10.4 >5.3 100.0 Ankle Abd Lao Brev 8.0     Knee    11.6  8.8  84.6 Knee Ankle 32.0 45 >39   Right Ulnar Motor (Abd Dig Minimi)  32.2°C   Wrist    2.8 <3.1 10.2 >7.0 100.0 Wrist Abd Dig Minimi 8.0  >50   B Elbow    5.7  7.9  77.5 B Elbow Wrist 20.0 69 >50   A Elbow    7.7  7.2  91.1 A Elbow B Elbow 10.0 50 >50     F Wave Studies     NR F-Lat (ms) Lat Norm (ms) L-R F-Lat (ms) L-R Lat Norm   Right Ulnar (Mrkrs) (Abd Dig Min)  23.3°C      25.92 <32  <2.5     H Reflex Studies     NR H-Lat (ms) L-R H-Lat (ms) L-R Lat Norm   Right Tibial (Gastroc)  24.4°C      29.16  <2.0     EMG     Side Muscle Nerve Root Ins Act Fibs Psw Recrt Duration Amp Poly Comment   Right Ext Dig Brev Dp Br Peron L5, S1 Nml Nml Nml Nml Nml Nml Nml    Right AbdHallucis MedPlantar S1-2 Nml Nml Nml Nml Nml Nml Nml    Right AntTibialis Dp Br Peron L4-5 Nml Nml Nml Nml Nml Nml Nml    Right MedGastroc Tibial S1-2 Nml Nml Nml Nml Nml Nml Nml    Right VastusLat Femoral L2-4 Nml Nml Nml Nml Nml Nml Nml    Right GluteusMed SupGluteal L4-S1 Nml Nml Nml Nml Nml Nml Nml    Right Lower Lumb Parasp Rami L5,S1 Nml Nml Nml Nml Nml Nml Nml    Right 1stDorInt Ulnar C8-T1 Nml Nml Nml Nml Nml Nml Nml    Right ExtIndicis Radial (Post Int) C7-8 Nml Nml Nml Nml Nml Nml Nml    Right Abd Poll Brev Median C8-T1 Nml Nml Nml Nml Nml Nml Nml    Right Biceps Musculocut C5-6 Nml Nml Nml Nml Nml Nml Nml    Right Triceps Radial C6-7-8 Nml Nml Nml Nml Nml Nml Nml    Right Deltoid Axillary C5-6 Nml Nml Nml Nml Nml Nml Nml    Right Lower Cerv Parasp Rami C7,T1 Nml Nml Nml Nml Nml Nml Nml                Nerve Conduction Studies  Anti Sensory Left/Right Comparison     Stim Site L Lat (ms) R Lat (ms) L-R Lat (ms) L Amp (µV) R Amp (µV) L-R Amp (%) Site1 Site2 L Hever (m/s) R Hever (m/s) L-R Hever (m/s)   Median Anti Sensory (2nd Digit)  32.7°C   Wrist  2.2   56.4  Wrist 2nd Digit  64    Radial Anti Sensory (Base 1st Digit)  23.5°C   Wrist  1.6   27.8  Wrist Base 1st Digit  63    Sup Fibular Anti Sensory (Lat ankle)  30.4°C   Lower leg  2.4   5.4  Lower leg Lat ankle  42    Sural Anti Sensory (Lat Mall)  31.3°C   Calf  2.0   4.3  Calf Lat Mall  70    Ulnar Anti Sensory (5th Digit)  32.3°C   Wrist  2.0   22.7  Wrist 5th Digit  70      Motor Left/Right Comparison     Stim Site L Lat (ms) R Lat (ms) L-R Lat (ms) L Amp (mV) R Amp (mV) L-R Amp (%) Site1 Site2 L Hever (m/s) R Hever (m/s) L-R Hever (m/s)   Fibular Motor (Ext Dig Brev)  24.2°C   Ankle  6.3   3.3  Ankle Ext Dig Brev      B Fib  11.9   3.1  B Fib Ankle  41    Poplt  14.1   2.9  Poplt B Fib  45    Median Motor (Abd Poll Brev)  23.5°C   Wrist  3.6   7.2  Wrist Abd Poll Brev      Elbow  7.2   6.8  Elbow Wrist  61    Tibial Motor (Abd Lao Brev)  29.8°C   Ankle  4.5   10.4  Ankle Abd Lao Brev      Knee  11.6   8.8  Knee Ankle  45    Ulnar Motor (Abd Dig Minimi)  32.2°C   Wrist  2.8   10.2  Wrist Abd Dig Minimi      B Elbow  5.7   7.9  B Elbow Wrist  69    A Elbow  7.7   7.2  A Elbow B Elbow  50          Waveforms:

## 2021-08-26 NOTE — PROCEDURES
EMG/ NCS Report  [facility] - INPATIENT  [place]  [place], [address] [place], [address]   Ph: [phone]   FAX: [phone]/ [phone]  Test Date:  2019      Test Date:  2021    Patient: Audelia Julien : 1975 Physician: Sawyer Wise MD   Sex: Male Height: ' \" Ref Phys: Bella Coronel   ID#:  [id number] Weight:  lbs. Technician: Wiley Crystal     Patient History / Exam:  Patient comes in with R hemisensory disturbance from head to foot with clear cut in the midline including numbness of R half of tongue. (-) weakness (+) stress with kids    Patient is coming for neuropathy evalaution        EMG & NCV Findings:  Evaluation of the right Fibular motor nerve showed normal distal onset latency (6.3 ms), normal amplitude (3.3 mV), normal conduction velocity (B Fib-Ankle, 41 m/s), and normal conduction velocity (Poplt-B Fib, 45 m/s). The right median motor nerve showed normal distal onset latency (3.6 ms), normal amplitude (7.2 mV), and normal conduction velocity (Elbow-Wrist, 61 m/s). The right tibial motor nerve showed normal distal onset latency (4.5 ms), normal amplitude (10.4 mV), and normal conduction velocity (Knee-Ankle, 45 m/s). The right ulnar motor nerve showed normal distal onset latency (2.8 ms), normal amplitude (10.2 mV), normal conduction velocity (B Elbow-Wrist, 69 m/s), and normal conduction velocity (A Elbow-B Elbow, 50 m/s). The right median sensory, the right radial sensory, the right sural sensory, and the right ulnar sensory nerves showed normal distal peak latency (R3.1, R2.2, R2.5, R2.8 ms) and normal amplitude (R56.4, R27.8, R4.3, R22.7 µV). The right Sup Fibular sensory nerve showed normal distal peak latency (3.1 ms), normal amplitude (5.4 µV), and normal conduction velocity (Lower leg-Lat ankle, 42 m/s). All F Wave latencies were within normal limits.       All examined muscles (as indicated in the following table) showed no evidence of electrical instability. Impression:    Extensive electrodiagnostic examination of the right upper and right lower extremities is normal.    Specifically, there is no evidence of a peripheral neuropathy, cervical motor radiculopathy or lumbosacral motor radiculopathy on the right.         Sawyer Wise MD  Diplomate, American Board of Psychiatry and Neurology  Diplomate, Neuromuscular Medicine  Diplomate, American Board of Electrodiagnostic Medicine  Director, [address] [place] [address] Accredited Laboratory with Exemplary Status          Nerve Conduction Studies  Anti Sensory Summary Table     Stim Site NR Peak (ms) Norm Peak (ms) P-T Amp (µV) Norm P-T Amp Site1 Site2 Dist (cm)   Right Median Anti Sensory (2nd Digit)  32.7°C   Wrist    3.1 <4 56.4 >13 Wrist 2nd Digit 14.0   Right Radial Anti Sensory (Base 1st Digit)  23.5°C   Wrist    2.2 <2.8 27.8 >11 Wrist Base 1st Digit 10.0   Right Sup Fibular Anti Sensory (Lat ankle)  30.4°C   Lower leg    3.1 <4.5 5.4 >5 Lower leg Lat ankle 10.0   Right Sural Anti Sensory (Lat Mall)  31.3°C   Calf    2.5 <4.5 4.3 >4.0 Calf Lat Mall 14.0   Right Ulnar Anti Sensory (5th Digit)  32.3°C   Wrist    2.8 <4.0 22.7 >9 Wrist 5th Digit 14.0     Motor Summary Table     Stim Site NR Onset (ms) Norm Onset (ms) O-P Amp (mV) Norm O-P Amp Amp (Prev) (%) Site1 Site2 Dist (cm) Hever (m/s) Norm Hever (m/s)   Right Fibular Motor (Ext Dig Brev)  24.2°C   Ankle    6.3 <6.5 3.3 >2.6 100.0 Ankle Ext Dig Brev 8.0     B Fib    11.9  3.1  93.9 B Fib Ankle 23.0 41 >38   Poplt    14.1  2.9  93.5 Poplt B Fib 10.0 45 >42   Right Median Motor (Abd Poll Brev)  23.5°C   Wrist    3.6 <4.5 7.2 >4.1 100.0 Wrist Abd Poll Brev 8.0     Elbow    7.2  6.8  94.4 Elbow Wrist 22.0 61 >49   Right Tibial Motor (Abd Lao Brev)  29.8°C   Ankle    4.5 <6.1 10.4 >5.3 100.0 Ankle Abd Lao Brev 8.0     Knee    11.6  8.8  84.6 Knee Ankle 32.0 45 >39   Right Ulnar Motor (Abd Dig Minimi)  32.2°C   Wrist    2.8 <3.1 10.2 >7.0 100.0 Wrist Abd Dig Minimi 8.0  >50   B Elbow    5.7  7.9  77.5 B Elbow Wrist 20.0 69 >50   A Elbow    7.7  7.2  91.1 A Elbow B Elbow 10.0 50 >50     F Wave Studies     NR F-Lat (ms) Lat Norm (ms) L-R F-Lat (ms) L-R Lat Norm   Right Ulnar (Mrkrs) (Abd Dig Min)  23.3°C      25.92 <32  <2.5     H Reflex Studies     NR H-Lat (ms) L-R H-Lat (ms) L-R Lat Norm   Right Tibial (Gastroc)  24.4°C      29.16  <2.0     EMG     Side Muscle Nerve Root Ins Act Fibs Psw Recrt Duration Amp Poly Comment   Right Ext Dig Brev Dp Br Peron L5, S1 Nml Nml Nml Nml Nml Nml Nml    Right AbdHallucis MedPlantar S1-2 Nml Nml Nml Nml Nml Nml Nml    Right AntTibialis Dp Br Peron L4-5 Nml Nml Nml Nml Nml Nml Nml    Right MedGastroc Tibial S1-2 Nml Nml Nml Nml Nml Nml Nml    Right VastusLat Femoral L2-4 Nml Nml Nml Nml Nml Nml Nml    Right GluteusMed SupGluteal L4-S1 Nml Nml Nml Nml Nml Nml Nml    Right Lower Lumb Parasp Rami L5,S1 Nml Nml Nml Nml Nml Nml Nml    Right 1stDorInt Ulnar C8-T1 Nml Nml Nml Nml Nml Nml Nml    Right ExtIndicis Radial (Post Int) C7-8 Nml Nml Nml Nml Nml Nml Nml    Right Abd Poll Brev Median C8-T1 Nml Nml Nml Nml Nml Nml Nml    Right Biceps Musculocut C5-6 Nml Nml Nml Nml Nml Nml Nml    Right Triceps Radial C6-7-8 Nml Nml Nml Nml Nml Nml Nml    Right Deltoid Axillary C5-6 Nml Nml Nml Nml Nml Nml Nml    Right Lower Cerv Parasp Rami C7,T1 Nml Nml Nml Nml Nml Nml Nml                Nerve Conduction Studies  Anti Sensory Left/Right Comparison     Stim Site L Lat (ms) R Lat (ms) L-R Lat (ms) L Amp (µV) R Amp (µV) L-R Amp (%) Site1 Site2 L Hever (m/s) R Hever (m/s) L-R Hever (m/s)   Median Anti Sensory (2nd Digit)  32.7°C   Wrist  2.2   56.4  Wrist 2nd Digit  64    Radial Anti Sensory (Base 1st Digit)  23.5°C   Wrist  1.6   27.8  Wrist Base 1st Digit  63    Sup Fibular Anti Sensory (Lat ankle)  30.4°C   Lower leg  2.4   5.4  Lower leg Lat ankle  42    Sural Anti Sensory (Lat Mall)  31.3°C   Calf  2.0   4.3  Calf Lat Mall  70    Ulnar Anti Sensory (5th Digit)  32.3°C   Wrist  2.0   22.7  Wrist 5th Digit  70      Motor Left/Right Comparison     Stim Site L Lat (ms) R Lat (ms) L-R Lat (ms) L Amp (mV) R Amp (mV) L-R Amp (%) Site1 Site2 L Hever (m/s) R Hever (m/s) L-R Hever (m/s)   Fibular Motor (Ext Dig Brev)  24.2°C   Ankle  6.3   3.3  Ankle Ext Dig Brev      B Fib  11.9   3.1  B Fib Ankle  41    Poplt  14.1   2.9  Poplt B Fib  45    Median Motor (Abd Poll Brev)  23.5°C   Wrist  3.6   7.2  Wrist Abd Poll Brev      Elbow  7.2   6.8  Elbow Wrist  61    Tibial Motor (Abd Lao Brev)  29.8°C   Ankle  4.5   10.4  Ankle Abd Lao Brev      Knee  11.6   8.8  Knee Ankle  45    Ulnar Motor (Abd Dig Minimi)  32.2°C   Wrist  2.8   10.2  Wrist Abd Dig Minimi      B Elbow  5.7   7.9  B Elbow Wrist  69    A Elbow  7.7   7.2  A Elbow B Elbow  50          Waveforms:

## 2021-09-21 ENCOUNTER — OFFICE VISIT (OUTPATIENT)
Dept: NEUROLOGY | Age: 46
End: 2021-09-21
Payer: COMMERCIAL

## 2021-09-21 VITALS
WEIGHT: 168 LBS | SYSTOLIC BLOOD PRESSURE: 122 MMHG | HEART RATE: 68 BPM | DIASTOLIC BLOOD PRESSURE: 82 MMHG | OXYGEN SATURATION: 98 % | BODY MASS INDEX: 27 KG/M2 | HEIGHT: 66 IN

## 2021-09-21 DIAGNOSIS — Z87.820 HISTORY OF MULTIPLE CONCUSSIONS: ICD-10-CM

## 2021-09-21 DIAGNOSIS — R20.0 RIGHT SIDED NUMBNESS: Primary | ICD-10-CM

## 2021-09-21 PROCEDURE — 99215 OFFICE O/P EST HI 40 MIN: CPT | Performed by: PSYCHIATRY & NEUROLOGY

## 2021-09-21 RX ORDER — CLONAZEPAM 1 MG/1
1 TABLET ORAL
Qty: 20 TABLET | Refills: 0 | Status: SHIPPED | OUTPATIENT
Start: 2021-09-21

## 2021-09-21 NOTE — PROGRESS NOTES
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 She normally sees SIA Glass in neurology and has had a very long thorough work-up to include a battery of neuroimaging this year remarkable for some mild stenosis and spondylosis throughout but no acute issue. MRI brain completely normal.  She had an EMG just last month normal without evidence of cervical or lumbosacral radiculopathy or peripheral neuropathy. EMG was completed in the setting of right-sided numbness. I reviewed her medical record to include her neuroimaging and EMG results. I spoke with her personally. In brief, she has a history of various head injuries dating as far back as 2016 I could appreciate in the record. The most recent in 2019 which was rather significant for her such that she had 2 events within the same day. She was outside enjoying the [employer]bonfire and her dog was attached to her who then ran quickly from her pulling her to the ground. She felt fine. No loss of consciousness. She had another event later in the evening tripping mechanically landing onto her side. No both of these occasions she made impact with the ground with the right and left side of her head respectively. The next day she had an extremely severe headache worst headache ever and eventually did go to the emergency room and was treated. Headaches do not seem to be a significant issue any longer. What is residual still is that she has an increasing startle reflex almost hypervigilant at times. Anxiety noted. She sleeps okay but wakes up tired. She is also noticing in the past several months intermittent choking spells and the sensation of right-sided numbness exclusively without radiation across the midline.   It affects from top to bottom such that she has numbness in the right side of her mouth. She also notices right-sided numbness in the medial thigh. She has had an extensive battery of imaging of the brain, the entire spine all without any findings to contribute to this. There is no evidence of MS or mass lesion or inflammation. EMG done last month completely normal without radiculopathy or neuropathy. Unfortunately she continues to still have symptoms. Understandably with the symptoms is increasing anxiety and stress. She has work stress and personal stress not unexpected given the current climate. Review of Systems   Constitutional: Positive for malaise/fatigue. Eyes: Negative for double vision. Neurological: Negative for focal weakness and weakness. Psychiatric/Behavioral: Positive for depression. Negative for suicidal ideas. The patient is nervous/anxious. All other systems reviewed and are negative.       Past Medical History:   Diagnosis Date    Anxiety     Concussion     -    Depression     Thyroid disease      Family History   Problem Relation Age of Onset    Hypertension Father     Cancer Maternal Grandfather         myeloma     Social History     Socioeconomic History    Marital status:      Spouse name: Not on file    Number of children: Not on file    Years of education: Not on file    Highest education level: Not on file   Occupational History    Not on file   Tobacco Use    Smoking status: Former Smoker     Years: 5.00     Quit date:      Years since quittin.7    Smokeless tobacco: Never Used    Tobacco comment: 1 PPW   Substance and Sexual Activity    Alcohol use: Not on file     Comment: with dinner  day    Drug use: No    Sexual activity: Yes     Partners: Male     Comment:  has had vasectomy   Other Topics Concern    Not on file   Social History Narrative    Not on file     Social Determinants of Health     Financial Resource Strain:     Difficulty of Paying Living Expenses:    Food Insecurity:     Worried About [facility] in the Last Year:    [address] in the Last Year:    Transportation Needs:     Lack of Transportation (Medical):  Lack of Transportation (Non-Medical):    Physical Activity:     Days of Exercise per Week:     Minutes of Exercise per Session:    Stress:     Feeling of Stress :    Social Connections:     Frequency of Communication with Friends and Family:     Frequency of Social Gatherings with Friends and Family:     Attends [employer] Services:     Active Member of Clubs or Organizations:     Attends Club or Organization Meetings:     Marital Status:    Intimate Partner Violence:     Fear of Current or Ex-Partner:     Emotionally Abused:     Physically Abused:     Sexually Abused:      Current Outpatient Medications   Medication Sig    clonazePAM (KlonoPIN) 1 mg tablet Take 1 Tablet by mouth two (2) times daily as needed for Anxiety. Max Daily Amount: 2 mg.  rimegepant (Nurtec ODT) 75 mg disintegrating tablet Take 75 mg by mouth once as needed for Migraine.  busPIRone (BUSPAR) 10 mg tablet Take 1 Tablet by mouth three (3) times daily.  levothyroxine (SYNTHROID) 75 mcg tablet TAKE ONE TABLET BY MOUTH EVERY MORNING ON AN EMPTY STOMACH    albuterol (PROVENTIL HFA, VENTOLIN HFA, PROAIR HFA) 90 mcg/actuation inhaler Take  by inhalation.  escitalopram oxalate (LEXAPRO) 20 mg tablet Take 20 mg by mouth daily.  MAGNESIUM PO Take  by mouth.  cholecalciferol (VITAMIN D3) (2,000 UNITS /50 MCG) cap capsule Take  by mouth two (2) times a day.  ondansetron hcl (ZOFRAN) 4 mg tablet Take 1 Tab by mouth every eight (8) hours as needed for Nausea.  melatonin 3 mg tablet Take 10 mg by mouth nightly.  B.infantis-B.ani-B.long-B.bifi (PROBIOTIC 4X) 10-15 mg TbEC Take  by mouth.  amitriptyline (ELAVIL) 25 mg tablet Take 1 Tablet by mouth nightly.  (Patient not taking: Reported on 9/21/2021)     No current facility-administered medications for this visit. Allergies   Allergen Reactions    Grass Pollen Unknown (comments)         Neurologic Exam     Mental Status   WD/WN adult in NAD, normal grooming  VSS  A&O x 3, very anxious tearful    PERRL, nonicteric  Face is symmetric, tongue midline  Speech is fluent and clear  No limb ataxia. No abnl movements. Moving all extemities spontaneously and symmetric  Normal gait           Physical Exam  Vitals and nursing note reviewed. Constitutional:       Appearance: Normal appearance. Neurological:      Mental Status: She is alert. Visit Vitals  /82 (BP 1 Location: Left upper arm, BP Patient Position: Sitting)   Pulse 68   Ht 5' 6\" (1.676 m)   Wt 168 lb (76.2 kg)   SpO2 98%   BMI 27.12 kg/m²       No results found for: WBC, WBCT, WBCPOC, HGB, HGBPOC, HCT, HCTPOC, PLT, PLTPOC, MCV, MCVPOC, HGBEXT, HCTEXT, PLTEXT  No results found for: HBA1C, LKB0VLWX, GLU, GESTF, GLUCPOC, MCACR, MCA1, MCA2, MCA3, MCAU, LDL, LDLC, DLDLP, REBECCA, CREAPOC, ACREA, CREA, REFC3, REFC4, GSE6QTDE   Lab Results   Component Value Date/Time    LDL-C, External 73 05/24/2018 12:00 AM     No results found for: ALTPOC, ALT, ASTPOC, GGT, AP, APIT, APX, CBIL, TBIL, TBILI, ALB, ALBPOC, TP, NH3, NH4, INR, INREXT, PTP, PTINR, PTEXT, PLT, PLTPOC, HCABQL, HBSAG, AFP, INREXT, PTEXT, PLTEXT       CT Results (maximum last 3): No results found for this or any previous visit. MRI Results (maximum last 3): Results from [place] encounter on 08/16/21    MRI CERV SPINE W WO CONT    Narrative  EXAM: MRI CERV SPINE W WO CONT    INDICATION: Neck pain. Clinical diagnosis of Radiculopathy, cervical region. COMPARISON: None    TECHNIQUE: MR imaging of the cervical spine was performed using the following  sequences: sagittal T1, T2, STIR;  axial T2, T1 prior to and following contrast  administration. CONTRAST: 15 mL of ProHance. FINDINGS:    Cervical kyphosis is centered at C5-C6. 1 mm posterior subluxation of C5-C6 and  C6-C7. Vertebral body heights are maintained. Marrow signal is normal. Mild disc  desiccation is greatest at C5-C6. No discitis. The craniocervical junction is intact. The course, caliber, and signal intensity  of the spinal cord are normal. There is no pathologic intrathecal enhancement. The paraspinal soft tissues are within normal limits. C2-C3: No herniation or stenosis. C3-C4: No herniation or stenosis. C4-C5: Asymmetric left posterior disc osteophyte complex. No stenosis. C5-C6: Asymmetric left posterior disc osteophyte complex. Mild central spinal  canal stenosis. Mild left foraminal stenosis. C6-C7: Posterior disc osteophyte complex. Mild central spinal canal stenosis. Mild right foraminal stenosis. C7-T1: No herniation or stenosis. Impression  1. Normal cervical spinal cord. No pathologic enhancement. 2. Mild stenoses at C5-6 and C6-7. MRI LUMB SPINE W WO CONT    Narrative  EXAM: MRI [address] SPINE W WO CONT, MRI LUMB SPINE W WO CONT    INDICATION: Abnormal bowel and bladder function. Lumbar spine surgery in 2001  and 2011. COMPARISON: None    TECHNIQUE: MR imaging of the thoracic and lumbar spine (2 separate studies  reported together) using the following sequences: sagittal T1, T2, stir; axial  T1, T2 prior to and following contrast administration. Examination presented for  my interpretation on 8/18/2021. CONTRAST: 15 mL of ProHance. FINDINGS:    There is subtle left curvature of the thoracic spine. No subluxation. Alignment  of the lumbar spine is within normal limits. Vertebral body heights are  maintained. Marrow signal is normal. Minimal disc desiccation in the thoracic  spine. Moderate disc desiccation and vacuum disc phenomenon at L5-S1. No  discitis. The course, caliber, and signal intensity of the spinal cord are normal. There  is no pathologic intrathecal enhancement. Conus medullaris terminates at L1.     The paraspinal soft tissues are within normal limits. No herniation or stenosis in the thoracic spine. L1-L2: No herniation or stenosis. L2-L3: Diffuse disc bulge, facet arthrosis, and thickened ligamentum flavum. No  stenosis. L3-L4: Diffuse disc bulge, facet arthrosis, and thickened ligamentum flavum. Minimal central spinal canal stenosis. L4-L5: Diffuse disc bulge, facet arthrosis, and thickened ligamentum flavum. Left foraminal dorsal annular tear. Mild central spinal canal stenosis. Patent  foramina. L5-S1: Lobulated disc bulge and facet arthrosis. Evidence of right  hemilaminotomy. Shallow right central disc protrusion contacts the right S1  nerve in the subarticular zone. Mild left foraminal stenosis. No significant  epidural granulation tissue. Impression  1. Normal spinal cord and cauda equina. 2. L5-S1 shallow right central disc protrusion may affect the right S1 nerve,  likely recurrent. Right L5 hemilaminotomy. 3. L4-5 left foraminal annular tear. 4. No stenosis in the thoracic spine. 5. No pathologic enhancement. MRI [facility] SPINE W WO CONT    Narrative  EXAM: MRI [facility] SPINE W WO CONT, MRI LUMB SPINE W WO CONT    INDICATION: Abnormal bowel and bladder function. Lumbar spine surgery in 2001  and 2011. COMPARISON: None    TECHNIQUE: MR imaging of the thoracic and lumbar spine (2 separate studies  reported together) using the following sequences: sagittal T1, T2, stir; axial  T1, T2 prior to and following contrast administration. Examination presented for  my interpretation on 8/18/2021. CONTRAST: 15 mL of ProHance. FINDINGS:    There is subtle left curvature of the thoracic spine. No subluxation. Alignment  of the lumbar spine is within normal limits. Vertebral body heights are  maintained. Marrow signal is normal. Minimal disc desiccation in the thoracic  spine. Moderate disc desiccation and vacuum disc phenomenon at L5-S1. No  discitis.     The course, caliber, and signal intensity of the spinal cord are normal. There  is no pathologic intrathecal enhancement. Conus medullaris terminates at L1. The paraspinal soft tissues are within normal limits. No herniation or stenosis in the thoracic spine. L1-L2: No herniation or stenosis. L2-L3: Diffuse disc bulge, facet arthrosis, and thickened ligamentum flavum. No  stenosis. L3-L4: Diffuse disc bulge, facet arthrosis, and thickened ligamentum flavum. Minimal central spinal canal stenosis. L4-L5: Diffuse disc bulge, facet arthrosis, and thickened ligamentum flavum. Left foraminal dorsal annular tear. Mild central spinal canal stenosis. Patent  foramina. L5-S1: Lobulated disc bulge and facet arthrosis. Evidence of right  hemilaminotomy. Shallow right central disc protrusion contacts the right S1  nerve in the subarticular zone. Mild left foraminal stenosis. No significant  epidural granulation tissue. Impression  1. Normal spinal cord and cauda equina. 2. L5-S1 shallow right central disc protrusion may affect the right S1 nerve,  likely recurrent. Right L5 hemilaminotomy. 3. L4-5 left foraminal annular tear. 4. No stenosis in the thoracic spine. 5. No pathologic enhancement. PET Results (maximum last 3): No results found for this or any previous visit. Assessment and Plan   Diagnoses and all orders for this visit:    1. Right sided numbness  -     clonazePAM (KlonoPIN) 1 mg tablet; Take 1 Tablet by mouth two (2) times daily as needed for Anxiety. Max Daily Amount: 2 mg.    2. History of multiple concussions  -     clonazePAM (KlonoPIN) 1 mg tablet; Take 1 Tablet by mouth two (2) times daily as needed for Anxiety. Max Daily Amount: 2 mg.      51-year-old woman with a constellation of symptoms at times very physically and emotionally impairing. She continues to have exclusive right-sided hemibody numbness of unclear etiology. Difficult to connect this to the history of head injuries.   She did have some significant injuries that have led to some residual disturbances in arousal such that she has a very heightened startle reflex. I reviewed all of the neuroimaging which was all reassuring. We certainly know were not dealing with multiple sclerosis or tumor or inflammatory process. We know were not dealing with any type of radiculopathy or neuropathy which is all reassuring. However, we still have a clear explanation for the right-sided numbness. Certainly atypical migraine could be considered. Stressors as well but that is a diagnosis of exclusion. I think she needs to explore alternative therapies particularly acupuncture. I would like her to start seeing an individual therapist and I have given her the name of a psychologist here locally to help. I am giving her some clonazepam in the interim to help with the anxiety and agitation. Start some meditation. Hopefully the symptoms will slowly resolve on their own. Again, I do not have a clear neurologic etiology but she certainly is not her baseline. I am reassured were not dealing with any life-threatening process. I do want her to continue seeing SIA Rucker. She is welcome to follow-up with me for any additional concerns to discuss further if needed. 45 minutes of time was spent in total today reviewing the medical record to include personally reviewing the neuroimaging, face-to-face time with examination and discussion, and time completing documentation. I reviewed and decided to continue the current medications. This clinical note was dictated with an electronic dictation software that can make unintentional errors. If there are any questions, please contact me directly for clarification. A notice of this visit/encounter being completed has been sent electronically to the patient's PCP and/or referring provider.      Laura Galeas, [address]  Diplomate [place]

## 2021-09-21 NOTE — PROGRESS NOTES
Chief Complaint   Patient presents with    Neurologic Problem     right sided numbness     Visit Vitals  /82 (BP 1 Location: Left upper arm, BP Patient Position: Sitting)   Pulse 68   Ht 5' 6\" (1.676 m)   Wt 168 lb (76.2 kg)   SpO2 98%   BMI 27.12 kg/m²

## 2022-03-18 PROBLEM — R60.9 EDEMA: Status: ACTIVE | Noted: 2020-07-29

## 2022-03-19 PROBLEM — E55.9 VITAMIN D DEFICIENCY: Status: ACTIVE | Noted: 2020-07-29

## 2022-03-19 PROBLEM — M79.2 PERIPHERAL NEUROPATHIC PAIN: Status: ACTIVE | Noted: 2020-07-29

## 2022-03-19 PROBLEM — E03.9 HYPOTHYROIDISM: Status: ACTIVE | Noted: 2020-07-29

## 2022-03-20 PROBLEM — G43.909 MIGRAINE: Status: ACTIVE | Noted: 2020-07-29

## 2022-03-20 PROBLEM — E04.1 THYROID NODULE: Status: ACTIVE | Noted: 2020-07-29

## 2023-05-25 RX ORDER — ESCITALOPRAM OXALATE 20 MG/1
20 TABLET ORAL DAILY
COMMUNITY

## 2023-05-25 RX ORDER — ONDANSETRON 4 MG/1
4 TABLET, FILM COATED ORAL EVERY 8 HOURS PRN
COMMUNITY
Start: 2019-03-21

## 2023-05-25 RX ORDER — ALBUTEROL SULFATE 90 UG/1
AEROSOL, METERED RESPIRATORY (INHALATION)
COMMUNITY

## 2023-05-25 RX ORDER — RIMEGEPANT SULFATE 75 MG/75MG
75 TABLET, ORALLY DISINTEGRATING ORAL
COMMUNITY

## 2023-05-25 RX ORDER — AMITRIPTYLINE HYDROCHLORIDE 25 MG/1
1 TABLET, FILM COATED ORAL NIGHTLY
COMMUNITY
Start: 2021-08-04

## 2023-05-25 RX ORDER — LEVOTHYROXINE SODIUM 0.07 MG/1
TABLET ORAL
COMMUNITY
Start: 2021-02-14

## 2023-05-25 RX ORDER — BUSPIRONE HYDROCHLORIDE 10 MG/1
10 TABLET ORAL 3 TIMES DAILY
COMMUNITY
Start: 2021-06-30

## 2023-05-25 RX ORDER — LANOLIN ALCOHOL/MO/W.PET/CERES
10 CREAM (GRAM) TOPICAL NIGHTLY
COMMUNITY

## 2023-05-25 RX ORDER — CLONAZEPAM 1 MG/1
1 TABLET ORAL 2 TIMES DAILY PRN
COMMUNITY
Start: 2021-09-21